# Patient Record
Sex: MALE | Race: WHITE | NOT HISPANIC OR LATINO | Employment: FULL TIME | URBAN - METROPOLITAN AREA
[De-identification: names, ages, dates, MRNs, and addresses within clinical notes are randomized per-mention and may not be internally consistent; named-entity substitution may affect disease eponyms.]

---

## 2018-09-08 ENCOUNTER — HOSPITAL ENCOUNTER (EMERGENCY)
Facility: HOSPITAL | Age: 23
Discharge: HOME | End: 2018-09-08
Attending: EMERGENCY MEDICINE
Payer: COMMERCIAL

## 2018-09-08 VITALS
TEMPERATURE: 98 F | HEIGHT: 68 IN | SYSTOLIC BLOOD PRESSURE: 95 MMHG | RESPIRATION RATE: 16 BRPM | OXYGEN SATURATION: 97 % | BODY MASS INDEX: 24.25 KG/M2 | DIASTOLIC BLOOD PRESSURE: 53 MMHG | WEIGHT: 160 LBS | HEART RATE: 62 BPM

## 2018-09-08 DIAGNOSIS — N99.89 POSTOPERATIVE SURGICAL COMPLICATION INVOLVING GENITOURINARY SYSTEM ASSOCIATED WITH GENITOURINARY PROCEDURE, UNSPECIFIED COMPLICATION: Primary | ICD-10-CM

## 2018-09-08 PROBLEM — Z78.9 FEMALE-TO-MALE TRANSGENDER PERSON: Status: ACTIVE | Noted: 2018-09-08

## 2018-09-08 PROBLEM — Z87.890: Status: ACTIVE | Noted: 2018-09-08

## 2018-09-08 LAB
ALBUMIN SERPL BCP-MCNC: 3.5 G/DL
ALP SERPL-CCNC: 81 U/L
ALT SERPL W/O P-5'-P-CCNC: 20 U/L
ANION GAP SERPL CALC-SCNC: 9 MMOL/L
AST SERPL-CCNC: 17 U/L
BACTERIA #/AREA URNS AUTO: ABNORMAL /HPF
BASOPHILS # BLD AUTO: 0.04 K/UL
BASOPHILS NFR BLD: 0.9 %
BILIRUB SERPL-MCNC: 0.2 MG/DL
BILIRUB UR QL STRIP: NEGATIVE
BUN SERPL-MCNC: 10 MG/DL
CALCIUM SERPL-MCNC: 9.6 MG/DL
CHLORIDE SERPL-SCNC: 107 MMOL/L
CLARITY UR REFRACT.AUTO: CLEAR
CO2 SERPL-SCNC: 26 MMOL/L
COLOR UR AUTO: ABNORMAL
CREAT SERPL-MCNC: 1 MG/DL
CRP SERPL-MCNC: 23.5 MG/L
DIFFERENTIAL METHOD: ABNORMAL
EOSINOPHIL # BLD AUTO: 0.2 K/UL
EOSINOPHIL NFR BLD: 4.6 %
ERYTHROCYTE [DISTWIDTH] IN BLOOD BY AUTOMATED COUNT: 13 %
EST. GFR  (AFRICAN AMERICAN): >60 ML/MIN/1.73 M^2
EST. GFR  (NON AFRICAN AMERICAN): >60 ML/MIN/1.73 M^2
GLUCOSE SERPL-MCNC: 76 MG/DL
GLUCOSE UR QL STRIP: NEGATIVE
HCT VFR BLD AUTO: 42.5 %
HGB BLD-MCNC: 13.5 G/DL
HGB UR QL STRIP: ABNORMAL
IMM GRANULOCYTES # BLD AUTO: 0.01 K/UL
IMM GRANULOCYTES NFR BLD AUTO: 0.2 %
KETONES UR QL STRIP: NEGATIVE
LACTATE SERPL-SCNC: 1.2 MMOL/L
LEUKOCYTE ESTERASE UR QL STRIP: ABNORMAL
LYMPHOCYTES # BLD AUTO: 1.8 K/UL
LYMPHOCYTES NFR BLD: 40.2 %
MCH RBC QN AUTO: 28.1 PG
MCHC RBC AUTO-ENTMCNC: 31.8 G/DL
MCV RBC AUTO: 89 FL
MICROSCOPIC COMMENT: ABNORMAL
MONOCYTES # BLD AUTO: 0.4 K/UL
MONOCYTES NFR BLD: 7.6 %
NEUTROPHILS # BLD AUTO: 2.1 K/UL
NEUTROPHILS NFR BLD: 46.5 %
NITRITE UR QL STRIP: NEGATIVE
NRBC BLD-RTO: 0 /100 WBC
PH UR STRIP: 6 [PH] (ref 5–8)
PLATELET # BLD AUTO: 201 K/UL
PMV BLD AUTO: 11.2 FL
POTASSIUM SERPL-SCNC: 3.9 MMOL/L
PROT SERPL-MCNC: 6.5 G/DL
PROT UR QL STRIP: NEGATIVE
RBC # BLD AUTO: 4.8 M/UL
RBC #/AREA URNS AUTO: 9 /HPF (ref 0–4)
SODIUM SERPL-SCNC: 142 MMOL/L
SP GR UR STRIP: 1.01 (ref 1–1.03)
URN SPEC COLLECT METH UR: ABNORMAL
UROBILINOGEN UR STRIP-ACNC: NEGATIVE EU/DL
WBC # BLD AUTO: 4.58 K/UL
WBC #/AREA URNS AUTO: 10 /HPF (ref 0–5)

## 2018-09-08 PROCEDURE — 25000003 PHARM REV CODE 250: Performed by: STUDENT IN AN ORGANIZED HEALTH CARE EDUCATION/TRAINING PROGRAM

## 2018-09-08 PROCEDURE — 63600175 PHARM REV CODE 636 W HCPCS: Performed by: STUDENT IN AN ORGANIZED HEALTH CARE EDUCATION/TRAINING PROGRAM

## 2018-09-08 PROCEDURE — 96365 THER/PROPH/DIAG IV INF INIT: CPT

## 2018-09-08 PROCEDURE — 96366 THER/PROPH/DIAG IV INF ADDON: CPT

## 2018-09-08 PROCEDURE — 99284 EMERGENCY DEPT VISIT MOD MDM: CPT | Mod: ,,, | Performed by: NURSE PRACTITIONER

## 2018-09-08 PROCEDURE — 81001 URINALYSIS AUTO W/SCOPE: CPT

## 2018-09-08 PROCEDURE — 96367 TX/PROPH/DG ADDL SEQ IV INF: CPT

## 2018-09-08 PROCEDURE — 99285 EMERGENCY DEPT VISIT HI MDM: CPT | Mod: 25

## 2018-09-08 PROCEDURE — 83605 ASSAY OF LACTIC ACID: CPT

## 2018-09-08 PROCEDURE — 87040 BLOOD CULTURE FOR BACTERIA: CPT

## 2018-09-08 PROCEDURE — 85025 COMPLETE CBC W/AUTO DIFF WBC: CPT

## 2018-09-08 PROCEDURE — 86140 C-REACTIVE PROTEIN: CPT

## 2018-09-08 PROCEDURE — 80053 COMPREHEN METABOLIC PANEL: CPT

## 2018-09-08 PROCEDURE — 96375 TX/PRO/DX INJ NEW DRUG ADDON: CPT

## 2018-09-08 RX ORDER — MORPHINE SULFATE 2 MG/ML
3 INJECTION, SOLUTION INTRAMUSCULAR; INTRAVENOUS ONCE
Status: COMPLETED | OUTPATIENT
Start: 2018-09-08 | End: 2018-09-08

## 2018-09-08 RX ORDER — ONDANSETRON 8 MG/1
8 TABLET, ORALLY DISINTEGRATING ORAL
Status: COMPLETED | OUTPATIENT
Start: 2018-09-08 | End: 2018-09-08

## 2018-09-08 RX ORDER — VANCOMYCIN HCL IN 5 % DEXTROSE 1.5G/250ML
20 PLASTIC BAG, INJECTION (ML) INTRAVENOUS
Status: COMPLETED | OUTPATIENT
Start: 2018-09-08 | End: 2018-09-08

## 2018-09-08 RX ORDER — ACETAMINOPHEN 500 MG
1000 TABLET ORAL
Status: COMPLETED | OUTPATIENT
Start: 2018-09-08 | End: 2018-09-08

## 2018-09-08 RX ADMIN — Medication 3 MG: at 01:09

## 2018-09-08 RX ADMIN — ACETAMINOPHEN 1000 MG: 500 TABLET ORAL at 01:09

## 2018-09-08 RX ADMIN — PIPERACILLIN AND TAZOBACTAM 4.5 G: 4; .5 INJECTION, POWDER, LYOPHILIZED, FOR SOLUTION INTRAVENOUS; PARENTERAL at 02:09

## 2018-09-08 RX ADMIN — VANCOMYCIN HYDROCHLORIDE 1500 MG: 10 INJECTION, POWDER, LYOPHILIZED, FOR SOLUTION INTRAVENOUS at 03:09

## 2018-09-08 RX ADMIN — ONDANSETRON 8 MG: 8 TABLET, ORALLY DISINTEGRATING ORAL at 01:09

## 2018-09-08 NOTE — ED PROVIDER NOTES
Encounter Date: 9/8/2018       History     Chief Complaint   Patient presents with    Wound Infection     Pt presented to the ED via EMS. Pt had gender reassignment surgery in [place] and contracted an infection.      23 FtoM presents 4wks s/p gender reassignment surgery c/o 2 day hx sharp, throbbing pelvic pain localized to deep pelvis and vaginectomy site. He underwent phalloplasty, scrotoplasty, and vaginectomy August 6th in [place]. He had previously undergone radical hysterectomy in May without complication.    Following his Aug 6th surgery he developed a post-op infection after 1-2 wks that was treated with bactrim x7 days and seemed to resolve the infection. 3 days ago he started developing fevers, orthostatic presyncope, lethargy, hematuria, dysuria, bladder spasms, and severe generalized deep pelvic pain more intense at the vaginectomy site. He presented to [facility] in [facility] where a CT abdo/pelvis demonstrated a fluid collection within the surgical phallus concerning for abscess vs hematoma vs seroma and was xfer to [facility] for higher level care. Denies SoB, headache, cp, syncope          Review of patient's allergies indicates:  No Known Allergies  No past medical history on file.  No past surgical history on file.  No family history on file.  Social History     Tobacco Use    Smoking status: Not on file   Substance Use Topics    Alcohol use: Not on file    Drug use: Not on file     Review of Systems   Constitutional: Positive for fatigue and fever.   HENT: Negative for congestion and sore throat.    Eyes: Negative for photophobia and visual disturbance.   Respiratory: Negative for cough, shortness of breath and wheezing.    Cardiovascular: Negative for chest pain, palpitations and leg swelling.   Gastrointestinal: Positive for abdominal pain (Lower abdo / pelvis) and nausea. Negative for abdominal distention, constipation, diarrhea and vomiting.   Genitourinary: Positive for dysuria,  genital sores, hematuria and penile pain. Negative for flank pain and frequency.        Deep, sharp, waxing/waning pelvic pain   Musculoskeletal: Negative for back pain, myalgias, neck pain and neck stiffness.   Skin: Positive for rash (Surgical site erythema and swelling) and wound. Negative for color change and pallor.   Neurological: Positive for dizziness, weakness and light-headedness. Negative for syncope and headaches.   Hematological: Does not bruise/bleed easily.   Psychiatric/Behavioral: Negative for confusion and decreased concentration.       Physical Exam     Initial Vitals [09/08/18 1217]   BP Pulse Resp Temp SpO2   103/63 (!) 56 18 98.2 °F (36.8 °C) 98 %      MAP       --         Physical Exam    Nursing note and vitals reviewed.  Constitutional: He appears well-developed and well-nourished. He is not diaphoretic. No distress.   HENT:   Head: Normocephalic and atraumatic.   Eyes: Conjunctivae are normal. No scleral icterus.   Neck: No JVD present.   Cardiovascular: Normal rate, regular rhythm, normal heart sounds and intact distal pulses. Exam reveals no gallop and no friction rub.    Pulmonary/Chest: Breath sounds normal. No stridor. No respiratory distress. He has no wheezes. He has no rhonchi. He has no rales.   Abdominal: Soft. Bowel sounds are normal. He exhibits no distension. There is tenderness (suprapubic tenderness diffusely to deep palpation. Suprapubic catheter in situ). There is no rebound and no guarding.   Genitourinary:   Genitourinary Comments: Surgically created phallus and scrotum with superficial ulceration and erythema to ventral penis without visible exudate. There is a perineal surgical wound posteroinferior to the scrotum that is highly erythematous and tender, pt states is vaginectomy site.    Musculoskeletal: He exhibits no edema or tenderness.   Neurological: He is alert and oriented to person, place, and time. GCS score is 15. GCS eye subscore is 4. GCS verbal subscore is  5. GCS motor subscore is 6.   Skin: Skin is warm.   Psychiatric: He has a normal mood and affect.         ED Course   Procedures  Labs Reviewed   CBC W/ AUTO DIFFERENTIAL - Abnormal; Notable for the following components:       Result Value    Hemoglobin 13.5 (*)     MCHC 31.8 (*)     All other components within normal limits   C-REACTIVE PROTEIN - Abnormal; Notable for the following components:    CRP 23.5 (*)     All other components within normal limits   URINALYSIS, REFLEX TO URINE CULTURE - Abnormal; Notable for the following components:    Occult Blood UA 2+ (*)     Leukocytes, UA Trace (*)     All other components within normal limits    Narrative:     Preferred Collection Type->Urine, Clean Catch   URINALYSIS MICROSCOPIC - Abnormal; Notable for the following components:    RBC, UA 9 (*)     WBC, UA 10 (*)     All other components within normal limits    Narrative:     Preferred Collection Type->Urine, Clean Catch   CULTURE, BLOOD   CULTURE, BLOOD   COMPREHENSIVE METABOLIC PANEL   LACTIC ACID, PLASMA     EKG Readings: (Independently Interpreted)   Initial Reading: No STEMI. Rhythm: Sinus Bradycardia. Heart Rate: 58. Ectopy: No Ectopy. Conduction: Normal. ST Segments: Normal ST Segments. T Waves: Normal. Axis: Normal. Clinical Impression: Sinus Bradycardia       Imaging Results    None          Medical Decision Making:   Initial Assessment:   23M presenting 4wks s/p FtoM gender reassignment surgery with 2 day hx sharp, throbbing pelvic pain worst at vaginectomy site, as well as UTI symptoms, fever, lethargy      Differential Diagnosis:   Differentials include but are not limited to: Cystitis, pelvic abscess, vaginovesical fistula, surgical site infection or fistula.   Clinical Tests:   Lab Tests: Ordered  ED Management:  Analgesia  Basic Labs, blood cultures  ABs  General Surgery Consult  Plastic Consult       APC / Resident Notes:   Assumed care patient.  Urology at bedside to see patient.  As per Urology  patient can be discharged home.  Patient already on Bactrim.  Drain placed to Desai bag for drainage. Patient to follow up with Urology and plastic surgery within the next 2 weeks.  Patient also states he has a follow-up in [place] with a specialist.  Patient to contact Urology for further plan.  Patient reassessed and aware of plan of care.  All questions and concerns addressed. Patient is hemodynamically stable, vital signs are normal. Discharge instructions given. Return to ED precautions discussed. Follow up as directed. Pt verbalized understanding of this plan. Pt is stable for discharge.            Attending Attestation:   Physician Attestation Statement for Resident:  As the supervising MD   Physician Attestation Statement: I have personally seen and examined this patient.    As the supervising MD I agree with the above PE.    As the supervising MD I agree with the above treatment, course, plan, and disposition.   -: Ct scan did not show any focal abscess and no high wbc. Is being evaluated by uro, gen surg and plastics.                        Clinical Impression:   The encounter diagnosis was Postoperative surgical complication involving genitourinary system associated with genitourinary procedure, unspecified complication.      Disposition:   Disposition: Discharged  Condition: Stable                        Luisana Rosales NP  09/08/18 3166

## 2018-09-08 NOTE — ED NOTES
"Pt. Laying in bed, a&ox4, gcs 15. Pt. States feeling fatigued, lethargic, generalized weakness x2 days. Pt. States fever pta, afebrile at this time. Had gender reassignment surgery 1 month ago, has since then noticed minimal wounds on posterior side of penis with minimal red-tinged drainage. Also c/o severe pain "throbbing" near fistula near scrotum. Given pain meds pta with relief. Pt. Has suprapubic cath in place pta, currently being treated for uti with po bactrim and iv rocephin from outlying facility. Pt. Also has bilateral skin grafts noted to thighs, minimal drainage noted, healing appropriately, no swelling, using aquacel at home. Pt. Denies sob, cp, abd pain. States intermittent nausea, given phenergan iv pta with relief. No other acute distress noted at this time, on cardiac and o2 monitor, call light within reach, will continue to monitor frequently.   "

## 2018-09-08 NOTE — ED PROVIDER NOTES
Encounter Date: 9/8/2018    SCRIBE #1 NOTE: Kelin WILLAMS am scribing for, and in the presence of, Dr. Rob. the Resident attestation.       History     Chief Complaint   Patient presents with    Wound Infection     Pt presented to the ED via EMS. Pt had gender reassignment surgery in [place] and contracted an infection.      HPI  Review of patient's allergies indicates:  No Known Allergies  No past medical history on file.  No past surgical history on file.  No family history on file.  Social History     Tobacco Use    Smoking status: Not on file   Substance Use Topics    Alcohol use: Not on file    Drug use: Not on file     Review of Systems    Physical Exam     Initial Vitals [09/08/18 1217]   BP Pulse Resp Temp SpO2   103/63 (!) 56 18 98.2 °F (36.8 °C) 98 %      MAP       --         Physical Exam    ED Course   Procedures  Labs Reviewed   CBC W/ AUTO DIFFERENTIAL - Abnormal; Notable for the following components:       Result Value    Hemoglobin 13.5 (*)     MCHC 31.8 (*)     All other components within normal limits   C-REACTIVE PROTEIN - Abnormal; Notable for the following components:    CRP 23.5 (*)     All other components within normal limits   URINALYSIS, REFLEX TO URINE CULTURE - Abnormal; Notable for the following components:    Occult Blood UA 2+ (*)     Leukocytes, UA Trace (*)     All other components within normal limits    Narrative:     Preferred Collection Type->Urine, Clean Catch   URINALYSIS MICROSCOPIC - Abnormal; Notable for the following components:    RBC, UA 9 (*)     WBC, UA 10 (*)     All other components within normal limits    Narrative:     Preferred Collection Type->Urine, Clean Catch   CULTURE, BLOOD   CULTURE, BLOOD   COMPREHENSIVE METABOLIC PANEL   LACTIC ACID, PLASMA          Imaging Results    None                       Attending Attestation:   Physician Attestation Statement for Resident:  As the supervising MD   Physician Attestation Statement: I have  personally seen and examined this patient.   I agree with the above history. -:                      Clinical Impression:   No diagnosis found.

## 2018-09-08 NOTE — ED NOTES
Patient resting comfortably. States no pain at this time. Cardiac and O2 monitor in place. No distress at this time will continue to monitor. Call light within reach side rails up and bed in lowest position.

## 2018-09-08 NOTE — ED NOTES
ED physician made aware of pain returning. Pt. Requesting valium for spasms. No other acute distress noted at this time. On cardiac and o2 monitor, call light within reach, will continue to monitor. Awaiting urology consult.

## 2018-09-08 NOTE — CONSULTS
[facility]  History & Physical  Plastic Surgery    SUBJECTIVE:     Chief Complaint: perineal pain    History of Present Illness:  Patient is a 23 y.o. male w/ a hx of male-female surgery with creation of neophallus and scrotoplasty with urethral lengthening, with suprapubic catheter on august 6. 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 He was in [place] until 4 days ago, and went back to his home in [place] [place], with plans to follow up with a urologist in [facility]. He does not have any established relationship with this specific urologist and does not have scheduled follow up at this time, but is planning on setting this up soon.   He has been in close contact with his surgeon, who does not know that he is here in the ED today.     He started bactrim 2 days ago due for a UTI.   He first presented at an OSH for significant perineal pain at the inferior aspect of the scrotoplasty. CT scan at the OSH was performed, which did not reveal any major findings, just a 1.3x4cm fluid collection at the distal end of the phallus.     review of patient's allergies indicates:  No Known Allergies    No past medical history on file.  No past surgical history on file.  No family history on file.  Social History     Tobacco Use    Smoking status: Not on file   Substance Use Topics    Alcohol use: Not on file    Drug use: Not on file        Review of Systems:  Constitutional: malaise  Respiratory: no cough or shortness of breath  Cardiovascular: no chest pain or palpitations  Gastrointestinal: no nausea or vomiting, tolerating diet  Integument/Breast: no rash or pruritis  Musculoskeletal: no arthralgias or myalgias  Neurological: no seizures or tremors  Behavioral/Psych: no auditory or visual hallucinations    OBJECTIVE:     Vital Signs (Most Recent):  Temp: 98.2 °F (36.8 °C) (09/08/18  1217)  Pulse: (!) 57 (09/08/18 1600)  Resp: 17 (09/08/18 1600)  BP: (!) 94/53 (09/08/18 1600)  SpO2: 99 % (09/08/18 1600)    Physical Exam:  General: no distress  Chest Wall: no tenderness  Abdomen: normal findings: nontender  Genitalia: viable neophallus, with known fistulous openings on ventral aspect. no erythema. open wound between neophallus and scrotoplasty, as well as scrotoplasty and perineum. no purulence.   Extremities: no cyanosis or edema, or clubbing  Skin: Skin color, texture, turgor normal. No rashes or lesions  Neurologic: Alert and oriented. Thought content appropriate   Thigh donor sites clean, with skin graft from L thigh to R thigh    Laboratory:  CBC:   Recent Labs   Lab  09/08/18   1301   WBC  4.58   RBC  4.80   HGB  13.5*   HCT  42.5   PLT  201   MCV  89   MCH  28.1   MCHC  31.8*     Recent Labs   Lab  09/08/18   1301   COLORU  Straw   SPECGRAV  1.015   PHUR  6.0   PROTEINUA  Negative   BACTERIA  Rare   NITRITE  Negative   LEUKOCYTESUR  Trace*   UROBILINOGEN  Negative       Diagnostic Results:  CT: Reviewed     Results: fluid collection at distal phallus measuring 4x1.3cm.     ASSESSMENT/PLAN:   23M s/p female to male conversion surgery, s/p neophallus, scrotoplasty, urethral lengthening on august 6, here with the specific complaint of perineal pain. No perineal findings on CT scan. No intervention indicated in regards to the fluid collection on the distal phallus.   -I made contact with  of dr villatoro, who will be following up with the patient via phone. I also left my contact information with her for him to contact me with questions  -he needs to follow up with urology as soon as possible. He has chosen a urologist in [facility], but still needs to make contact  -consider urology consult today  -although he does not live here in town, I offered for him to follow up with us should he have any concerns, with Dr Santoyo. Information will be added to the follow up  info  -continue bactrim- he already has these prescribed, and this can be directed by dr villatoro or urology    Milton Méndez MD  Plastic Surgery PGY6

## 2018-09-08 NOTE — ED TRIAGE NOTES
Pt. Presents to ED today as transfer from [facility] with ems for wound infection post gender reassignment surgery. Pt. States severe pain in perineum x1 day, given morphine and phenergan pta. Pt. Has suprapubic cath in place pta with current uti that was treated with antbx. Skin grafts noted to bilateral thighs.

## 2018-09-09 NOTE — SUBJECTIVE & OBJECTIVE
No past medical history on file.    No past surgical history on file.    Review of patient's allergies indicates:  No Known Allergies    Family History     None          Tobacco Use    Smoking status: Not on file   Substance and Sexual Activity    Alcohol use: Not on file    Drug use: Not on file    Sexual activity: Not on file       Review of Systems   Constitutional: Negative for activity change, appetite change, chills, fever and unexpected weight change.   HENT: Negative.    Eyes: Negative.    Respiratory: Negative for chest tightness and shortness of breath.    Cardiovascular: Negative for chest pain.   Gastrointestinal: Negative for abdominal distention, abdominal pain, nausea and vomiting.   Genitourinary:        Pelvic pain  Urinary leakage from fistula   Musculoskeletal: Negative.    Skin: Negative.    Neurological: Negative.    Psychiatric/Behavioral: Negative.        Objective:     Temp:  [98.2 °F (36.8 °C)-98.7 °F (37.1 °C)] 98.2 °F (36.8 °C)  Pulse:  [53-97] 63  Resp:  [14-20] 18  SpO2:  [96 %-100 %] 98 %  BP: ()/(52-70) 102/64     Body mass index is 24.33 kg/m².            Drains          None          Physical Exam   Constitutional: He appears well-developed and well-nourished. No distress.   HENT:   Head: Normocephalic and atraumatic.   Eyes: EOM are normal. No scleral icterus.   Cardiovascular: Normal rate and regular rhythm.    Pulmonary/Chest: Effort normal. No respiratory distress.   Abdominal: Soft. He exhibits no distension. There is no tenderness.   SPT site with 16fr catheter with clear yellow urine   Genitourinary:   Genitourinary Comments: Neophallus with small areas that appear to have dehisced at ventral suture line. Neomeatus patent. No areas of fluctuance of neophallus. Marc-penoscrotal junction with obvious wound dehiscence, area is damp and extremely tender. Neoscrotum tender, no areas of fluctuance. Vaginectomy site mostly healed, one small area of erythematous and inflamed  tissue at most dependent portion near posterior aspect of neoscrotum at the midline.    Musculoskeletal: He exhibits no edema.   Neurological: He is alert.   Skin: Skin is warm and dry.     Psychiatric: He has a normal mood and affect. His behavior is normal.       Significant Labs:    BMP:  Recent Labs   Lab  09/08/18   1301   NA  142   K  3.9   CL  107   CO2  26   BUN  10   CREATININE  1.0   CALCIUM  9.6       CBC:  Recent Labs   Lab  09/08/18   1301   WBC  4.58   HGB  13.5*   HCT  42.5   PLT  201       All pertinent labs results from the past 24 hours have been reviewed.    Significant Imaging:  CT: I have reviewed all results within the past 24 hours and my personal findings are:  CT from OSH not personally reviewed and report not available. Per plastic surgery note: 1.3 x 4 cm fluid collection at distal portion of neophallus, likely hematoma

## 2018-09-09 NOTE — ED NOTES
Pt. Denies questions or concerns regarding discharge instructions or fu. No acute distress noted. Ambulatory with steady gait to lobby.

## 2018-09-09 NOTE — ASSESSMENT & PLAN NOTE
- This is an unusual and unfamiliar post op issue. 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 He keeps his SPT clamped which directs urine through his neourethra and fistulous tract. Have discussed with the patient that he will need to divert urine away from his fistula in order for the area to mature. Once mature, the area can can be properly evaluated for definitive repair. Since he already has a suprapubic tube I have recommended that the patient unclamp the tube and leave it draining to gravity. He has urinary sphincter function and is not incontinent. This should prevent urinary leakage through the fistula.   - Will have him see Dr. Holland and possibly plastic surgery in 1-2 weeks here in [place]. At that point his issue can be further evaluated and a plan regarding reconstruction options can be had. However, he also has a physician in [place] that he would also like to meet with who specializes in fistulas and reconstruction.

## 2018-09-09 NOTE — HPI
22yo M with history of interstitial cystitis who underwent gender reassignment surgery (female to male) on August 6th by Dr. Delgadillo in [place]. 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 He comes to the ED today with complaints of perineal pain and poorly healing suture lines and skin irritation. He is from just outside of [place], [place] and was planning on finding a urologist to help with his fistula when he came left [place] on September 4th. He went to an outside hospital in [place] for evaluation and was transferred to [facility]. He has a suprapubic catheter that was placed at the time of his surgery. He keeps this clamped until he needs to void. When he voids he has urinary leakage through the fistula, very little if any urine makes it to the neomeatus. He unclamps his SPT for additional drainage.     On arrival to the ED he is afebrile with stable vital signs. He does not have a leukocytosis. His lab values are all normal. UA from SPT is clear (he is on Bactrim).

## 2018-09-10 ENCOUNTER — TELEPHONE (OUTPATIENT)
Dept: UROLOGY | Facility: CLINIC | Age: 23
End: 2018-09-10

## 2018-09-13 LAB
BACTERIA BLD CULT: NORMAL
BACTERIA BLD CULT: NORMAL

## 2018-09-24 ENCOUNTER — OFFICE VISIT (OUTPATIENT)
Dept: PLASTIC SURGERY | Facility: CLINIC | Age: 23
End: 2018-09-24
Payer: COMMERCIAL

## 2018-09-24 ENCOUNTER — OFFICE VISIT (OUTPATIENT)
Dept: UROLOGY | Facility: CLINIC | Age: 23
End: 2018-09-24
Payer: COMMERCIAL

## 2018-09-24 VITALS
BODY MASS INDEX: 25.01 KG/M2 | DIASTOLIC BLOOD PRESSURE: 71 MMHG | SYSTOLIC BLOOD PRESSURE: 111 MMHG | HEIGHT: 68 IN | RESPIRATION RATE: 15 BRPM | HEART RATE: 61 BPM | WEIGHT: 165 LBS

## 2018-09-24 DIAGNOSIS — Z87.890 TRANSGENDER, S/P SEX REASSIGNMENT SURGERY: ICD-10-CM

## 2018-09-24 DIAGNOSIS — Z78.9 FEMALE-TO-MALE TRANSGENDER PERSON: Primary | ICD-10-CM

## 2018-09-24 DIAGNOSIS — N36.0 URETHRAL FISTULA: Primary | ICD-10-CM

## 2018-09-24 PROCEDURE — 99999 PR PBB SHADOW E&M-EST. PATIENT-LVL IV: CPT | Mod: PBBFAC,,, | Performed by: UROLOGY

## 2018-09-24 PROCEDURE — 99202 OFFICE O/P NEW SF 15 MIN: CPT | Mod: S$GLB,,, | Performed by: SURGERY

## 2018-09-24 PROCEDURE — 99205 OFFICE O/P NEW HI 60 MIN: CPT | Mod: S$GLB,,, | Performed by: UROLOGY

## 2018-09-24 PROCEDURE — 3008F BODY MASS INDEX DOCD: CPT | Mod: CPTII,S$GLB,, | Performed by: UROLOGY

## 2018-09-24 RX ORDER — DEXTROAMPHETAMINE SACCHARATE, AMPHETAMINE ASPARTATE, DEXTROAMPHETAMINE SULFATE AND AMPHETAMINE SULFATE 5; 5; 5; 5 MG/1; MG/1; MG/1; MG/1
TABLET ORAL
COMMUNITY
Start: 2018-09-18 | End: 2018-09-24

## 2018-09-24 RX ORDER — DEXTROAMPHETAMINE SACCHARATE, AMPHETAMINE ASPARTATE, DEXTROAMPHETAMINE SULFATE AND AMPHETAMINE SULFATE 2.5; 2.5; 2.5; 2.5 MG/1; MG/1; MG/1; MG/1
TABLET ORAL
COMMUNITY
End: 2018-09-24

## 2018-09-24 RX ORDER — OXYBUTYNIN CHLORIDE 5 MG/1
TABLET ORAL 3 TIMES DAILY
COMMUNITY
Start: 2018-09-21

## 2018-09-24 RX ORDER — TESTOSTERONE CYPIONATE 1000 MG/10ML
150 INJECTION, SOLUTION INTRAMUSCULAR
COMMUNITY

## 2018-09-24 RX ORDER — DEXTROAMPHETAMINE SACCHARATE, AMPHETAMINE ASPARTATE, DEXTROAMPHETAMINE SULFATE AND AMPHETAMINE SULFATE 5; 5; 5; 5 MG/1; MG/1; MG/1; MG/1
20 TABLET ORAL
COMMUNITY

## 2018-09-24 NOTE — LETTER
September 25, 2018      Denton Walton MD  [address]  [facility] [address]           [facility]  [address]  [facility] [address]  Phone: [phone]          Patient: Xu Bonilla   MR Number: [id number]   YOB: 1995   Date of Visit: 9/24/2018       Dear Dr. Denton Walton:    Thank you for referring Xu Bonilla to me for evaluation. Attached you will find relevant portions of my assessment and plan of care.    If you have questions, please do not hesitate to call me. I look forward to following Xu Bonilla along with you.    Sincerely,    Alfredito Holland MD    Enclosure  CC:  No Recipients    If you would like to receive this communication electronically, please contact [email or web address] or [phone] to request more information on [employer] Link access.    For providers and/or their staff who would like to refer a patient to [facility], please contact us through our one-stop-shop provider referral line, [facility] , at [phone].    If you feel you have received this communication in error or would no longer like to receive these types of communications, please e-mail [email or web address]

## 2018-09-24 NOTE — PROGRESS NOTES
PLASTIC SURGERY CONSULTATION NOTE  Referring: Dr. Alfredito Holland    CC  No chief complaint on file.  Phalloplasty, with urinary fistula    PCP: Primary Doctor No    HPI  History from patient, chart, referring provider, and patient    Xu Bonilla is a 23 y.o. male presenting with urinary fistula after thigh based phalloplasty 2 months ago.  His surgeons were in [place].  He stayed there for four weeks.  He currently lives in [place] and is returning to work as an EMT.  Earlier this month he presented to the ED with pelvic pain and was scanned.  He was instructed by his plastic surgeon to follow up with a urologist for management of his known fistula, suprapubic catheter.  The patient reports that he had a fonseca crossing his urethral anastomosis for 1 month.  It was taken out in [place].  He has performed voiding trials.  He was planning on returning to either [place] or [place] for his second stage debulking procedure.   He states that he had a vein graft taken from his right lower extremity.  He states that his tissue transfer was done as a microsurgical procedure.  He is open to the idea of having a fonseca placed in his neophallus if this is technically possible to help keep it patent while he is awaiting care with another surgeon.      PM  Patient Active Problem List    Diagnosis Date Noted    Transgender, S/P sex reassignment surgery 09/08/2018   As noted above, otherwise healthy    [facility]  Past Surgical History:   Procedure Laterality Date    bilateral subcutaneous mastectomy  2015    HYSTERECTOMY      phalloplasty  08/06/2018    TONSILLECTOMY  2017   As noted above, otherwise healthy    FH  Family History   Problem Relation Age of Onset    No Known Problems Father     Personality disorder Mother     Bipolar disorder Mother    Pertinent HPI negative    MEDICATIONS  No outpatient medications have been marked as taking for the 9/24/18 encounter (Appointment) with Doc Santoyo MD.  "    ALLERGIES   Review of patient's allergies indicates:   Allergen Reactions    Lortab [hydrocodone-acetaminophen] Other (See Comments)     Cannot sleep - gets "wired"     SOCIAL HISTORY  Tobacco:   Social History     Tobacco Use   Smoking Status Former Smoker    Start date: 2010    Last attempt to quit: 2016    Years since quittin.0     EtOH:   Social History     Substance and Sexual Activity   Alcohol Use Yes    Alcohol/week: 0.6 oz    Types: 1 Cans of beer per week   Works as an EMT, returning to work tomorrow.      ROS  Review of Systems:  Constitutional: malaise  Respiratory: no cough or shortness of breath  Cardiovascular: no chest pain or palpitations  Gastrointestinal: no nausea or vomiting, tolerating diet  Integument/Breast: no rash or pruritis  Musculoskeletal: no arthralgias or myalgias  Neurological: no seizures or tremors  Behavioral/Psych: no auditory or visual hallucinations    PHYSICAL EXAM  There were no vitals taken for this visit.  Patient points to granulating punctum over ventral neophallus that is the site of urinary drainage  He points out a second punctum within the perineal border of his neoscrotum that he says is not leaking urine  Right thigh skin graft healing  Right groin incision in tact  Bulky neophallus that appears viable  Able to express fluid from distal neourethra, not purulent  No evidence of warmth, purulent drainage, or cellulitis in right thigh or neophallus    General: no distress  Chest Wall: no tenderness  Abdomen: normal findings: nontender  Extremities: no cyanosis or edema, or clubbing  Skin: Skin color, texture, turgor normal. No rashes or lesions  Neurologic: Alert and oriented. Thought content appropriate     LABS  Lab Results   Component Value Date    WBC 4.58 2018    HGB 13.5 (L) 2018    HCT 42.5 2018    MCV 89 2018     2018     Lab Results   Component Value Date     2018    K 3.9 2018    "  09/08/2018    CO2 26 09/08/2018     Lab Results   Component Value Date    ALBUMIN 3.5 09/08/2018     Lab Results   Component Value Date    CREATININE 1.0 09/08/2018     No results found for: LABA1C, HGBA1C    Previously reviewed CT scan: fluid collection within tubed neophallus distal to where he anatomically would have had urethral anastomosis.  No evidence of pelvic fluid collection.      ASSESSMENT  No diagnosis found.  24 yo M who completed female to male gender confirmation surgery with microsurgically reconstructed with apparent urinary fistula through neophallus flap that is very close to his urethral anastomosis.  No evidence of infection today.    PLAN  After extensive discussions regarding all of these issues, it is my recommendation that Dr. Holland consider scope examination to determine whether a fonseca can be placed safely across the anastomosis between his jovani-urethra and native urethra.   The patient had ample opportunity to ask questions and verbalized understanding of the surgical treatment plan.    I have spent 20 minutes of face to face time with the patient, of which > 50% was spent on counseling and coordination of care for gender confirmation.       Doc Santoyo

## 2018-09-24 NOTE — LETTER
September 24, 2018      Alfredito Holland MD  [address]  [facility] [address]           [facility]  [address]  [facility] [address]  Phone: [phone]  Fax: [phone]          Patient: Xu Bonilla   MR Number: [id number]   YOB: 1995   Date of Visit: 9/24/2018       Dear Dr. Alfredito Holland:    Thank you for referring Xu Bonilla to me for evaluation. Attached you will find relevant portions of my assessment and plan of care.    If you have questions, please do not hesitate to call me. I look forward to following Xu Bonilla along with you.    Sincerely,    Doc Santoyo MD    Enclosure  CC:  No Recipients    If you would like to receive this communication electronically, please contact [email or web address] or [phone] to request more information on [employer] access.    For providers and/or their staff who would like to refer a patient to [facility], please contact us through our one-stop-shop provider referral line, [facility] [place], at [phone].    If you feel you have received this communication in error or would no longer like to receive these types of communications, please e-mail [email or web address]

## 2018-09-25 NOTE — PATIENT INSTRUCTIONS
Cystoscopy    Cystoscopy is a procedure that lets your doctor look directly inside your urethra and bladder. It can be used to:  · Help diagnose a problem with your urethra, bladder, or kidneys.  · Take a sample (biopsy) of bladder or urethral tissue.  · Treat certain problems (such as removing kidney stones).  · Place a stent to bypass an obstruction.  · Take special X-rays of the kidneys.  Based on the findings, your doctor may recommend other tests or treatments.  What is a cystoscope?  A cystoscope is a telescope-like instrument that contains lenses and fiberoptics (small glass wires that make bright light). The cystoscope may be straight and rigid, or flexible to bend around curves in the urethra. The doctor may look directly into the cystoscope, or project the image onto a monitor.  Getting ready  · Ask your doctor if you should stop taking any medicines before the procedure.  · Ask whether you should avoid eating or drinking anything after midnight before the procedure.  · Follow any other instructions your doctor gives you.  Tell your doctor before the exam if you:  · Take any medicines, such as aspirin or blood thinners  · Have allergies to any medicines  · Are pregnant   The procedure  Cystoscopy is done in the doctors office, surgery center, or hospital. The doctor and a nurse are present during the procedure. It takes only a few minutes, longer if a biopsy, X-ray, or treatment needs to be done.  During the procedure:  · You lie on an exam table on your back, knees bent and legs apart. You are covered with a drape.  · Your urethra and the area around it are washed. Anesthetic jelly may be applied to numb the urethra. Other pain medicine is usually not needed. In some cases, you may be offered a mild sedative to help you relax. If a more extensive procedure is to be done, such as a biopsy or kidney stone removal, general anesthesia may be needed.  · The cystoscope is inserted. A sterile fluid is put  into the bladder to expand it. You may feel pressure from this fluid.  · When the procedure is done, the cystoscope is removed.  After the procedure  If you had a sedative, general anesthesia, or spinal anesthesia, you must have someone drive you home. Once youre home:  · Drink plenty of fluids.  · You may have burning or light bleeding when you urinate--this is normal.  · Medicines may be prescribed to ease any discomfort or prevent infection. Take these as directed.  · Call your doctor if you have heavy bleeding or blood clots, burning that lasts more than a day, a fever over 100°F  (38° C), or trouble urinating.  Date Last Reviewed: 1/1/2017  © 6284-7763 [employer]. [address] [place] [address], [place] [address]. All rights reserved. This information is not intended as a substitute for professional medical care. Always follow your healthcare professional's instructions.

## 2018-09-25 NOTE — PROGRESS NOTES
"Subjective:       Patient ID: Xu Bonilla is a 23 y.o. male.    Chief Complaint: Fistula (consult)      HPI: Xu Bonilla is a 23 y.o. White male who presents today for evaluation and management of a urethral fistula s/p sex reassignment surgery in August 2018.    The patient recently underwent gender reassignment surgery from a female to a male in [place]. He recently returned home and has been having draining from his perineum where likely his urethra and jovani-urethra were anastomosed. Per the patient, he failed multiple voiding trials. He still has a suprapubic tube in place which appears to be managing his drainage adequately.    The patient is still in significant discomfort in his perineum related to the fistula.    He presents today to discuss how best to manage his likely urethral fistula.    Review of patient's allergies indicates:   Allergen Reactions    Lortab [hydrocodone-acetaminophen] Other (See Comments)     Cannot sleep - gets "wired"       Current Outpatient Medications   Medication Sig Dispense Refill    dextroamphetamine-amphetamine (ADDERALL) 20 mg tablet Take 20 mg by mouth.      multivitamin capsule Take 1 capsule by mouth once daily.      oxybutynin (DITROPAN) 5 MG Tab       testosterone cypionate (DEPOTESTOTERONE CYPIONATE) 100 mg/mL injection Inject 150 mg into the muscle every 14 (fourteen) days.       No current facility-administered medications for this visit.        History reviewed. No pertinent past medical history.    Past Surgical History:   Procedure Laterality Date    bilateral subcutaneous mastectomy  2015    HYSTERECTOMY      phalloplasty  08/06/2018    TONSILLECTOMY  2017       Family History   Problem Relation Age of Onset    No Known Problems Father     Personality disorder Mother     Bipolar disorder Mother        Review of Systems    Review of Systems   Constitutional: Negative for fever, chills, activity change, appetite change and unexpected weight change. "   HENT: Negative for congestion, nosebleeds, sneezing, sore throat and trouble swallowing.    Eyes: Negative for pain, discharge, redness and visual disturbance.   Respiratory: Negative for cough, choking, chest tightness and shortness of breath.    Cardiovascular: Negative for chest pain, palpitations and leg swelling.   Gastrointestinal: Negative for nausea, vomiting, abdominal pain, diarrhea, blood in stool, abdominal distention and anal bleeding.  Genitourinary: As documented per HPI   Endocrine: Negative for cold intolerance, heat intolerance, polydipsia, polyphagia and polyuria.   Musculoskeletal: Negative for myalgias, gait problem, neck pain and neck stiffness.   Skin: Negative for color change, pallor, rash and wound.   Allergic/Immunologic: Negative for immunocompromised state.   Neurological: Negative for seizures, facial asymmetry, speech difficulty, weakness and light-headedness.   Hematological: Negative for adenopathy. Does not bruise/bleed easily.   Psychiatric/Behavioral: Negative for hallucinations, behavioral problems, self-injury and agitation. The patient is not hyperactive.      All other systems were reviewed and were negative.    Objective:     Vitals:    09/24/18 0833   BP: 111/71   Pulse: 61   Resp: 15     Physical Exam   Vitals reviewed.  Constitutional: He is oriented to person, place, and time. He appears well-developed and well-nourished. No distress.   HENT:   Head: Normocephalic and atraumatic.   Right Ear: External ear normal.   Left Ear: External ear normal.   Nose: Nose normal.   Eyes: EOM are normal. Pupils are equal, round, and reactive to light. Right eye exhibits no discharge. Left eye exhibits no discharge.   Neck: Normal range of motion. Neck supple. No tracheal deviation present. No thyroid enlargement or tenderness.   Cardiovascular: Regular rhythm and intact distal pulses. No signs of peripheral edema.   Pulmonary/Chest: Effort normal and breath sounds normal. No stridor.  No respiratory distress. He has no wheezes.   Abdominal: Soft. Bowel sounds are normal. He exhibits no distension. There is no tenderness. Hernia confirmed negative in the right inguinal area and confirmed negative in the left inguinal area. No hepatic or splenic enlargement or tenderness.  Genitourinary: Large marc-phallus with ventral stipling of the skin. Distal marc-meatus. Marc-scrotum made of labia, empty. Disruption of perineal skin at likely urethra/macr-urethra anastomosis.  NGUYEN: Deferred.  Musculoskeletal: Normal range of motion. He exhibits no edema.   Neurological: He is alert and oriented to person, place, and time. He exhibits normal muscle tone. Coordination normal.   Skin: Skin is warm. No rash noted.   Lymphatic: No palpable lymph nodes.  Psychiatric: He has a normal mood and affect. His behavior is normal. Judgment and thought content normal.     No results found for: PSA  Lab Results   Component Value Date    CREATININE 1.0 09/08/2018     Lab Results   Component Value Date    EGFRNONAA >60.0 09/08/2018     Lab Results   Component Value Date    ESTGFRAFRICA >60.0 09/08/2018     I have personally reviewed all the patient's relevant  imaging.    Assessment:       1. Urethral fistula    2. Transgender, S/P sex reassignment surgery        Plan:     Xu was seen today for fistula.    Diagnoses and all orders for this visit:    Urethral fistula  -     Diet NPO; Standing  -     Case Request Operating Room: CYSTOSCOPY, FLEXIBLE, POSSIBLE ANTEGRADE CYSTOSCOPY  -     Place in Outpatient; Standing  -     Reason for no VTE Prophylaxis; Standing    Transgender, S/P sex reassignment surgery  -     Diet NPO; Standing  -     Case Request Operating Room: CYSTOSCOPY, FLEXIBLE, POSSIBLE ANTEGRADE CYSTOSCOPY  -     Place in Outpatient; Standing  -     Reason for no VTE Prophylaxis; Standing    The patient was also seen by Dr. Holm from plastic surgery today.    It appears that the patient has a fistula. Ideally,  it would be best to help with healing if we could place a Desai catheter through his jovani-phallus into his bladder. We need to start evaluation with flexible cystoscopy via his jovani-urethra to determine if there is a stricture. Once we have a diagnostic evaluation done, we can determine how best to proceed.    We explained our thought process to the patient who was agreeable.    We answered all his questions.    I encouraged him or any of his family members to call or email me with questions and/or concerns.    I spent 60 minutes with the patient of which more than half was spent in coordinating the patient's care as well as in direct consultation with the patient in regards to our treatment and plan.

## 2018-09-25 NOTE — H&P (VIEW-ONLY)
"Subjective:       Patient ID: Xu Bonilla is a 23 y.o. male.    Chief Complaint: Fistula (consult)      HPI: Xu Bonilla is a 23 y.o. White male who presents today for evaluation and management of a urethral fistula s/p sex reassignment surgery in August 2018.    The patient recently underwent gender reassignment surgery from a female to a male in [place]. He recently returned home and has been having draining from his perineum where likely his urethra and jovani-urethra were anastomosed. Per the patient, he failed multiple voiding trials. He still has a suprapubic tube in place which appears to be managing his drainage adequately.    The patient is still in significant discomfort in his perineum related to the fistula.    He presents today to discuss how best to manage his likely urethral fistula.    Review of patient's allergies indicates:   Allergen Reactions    Lortab [hydrocodone-acetaminophen] Other (See Comments)     Cannot sleep - gets "wired"       Current Outpatient Medications   Medication Sig Dispense Refill    dextroamphetamine-amphetamine (ADDERALL) 20 mg tablet Take 20 mg by mouth.      multivitamin capsule Take 1 capsule by mouth once daily.      oxybutynin (DITROPAN) 5 MG Tab       testosterone cypionate (DEPOTESTOTERONE CYPIONATE) 100 mg/mL injection Inject 150 mg into the muscle every 14 (fourteen) days.       No current facility-administered medications for this visit.        History reviewed. No pertinent past medical history.    Past Surgical History:   Procedure Laterality Date    bilateral subcutaneous mastectomy  2015    HYSTERECTOMY      phalloplasty  08/06/2018    TONSILLECTOMY  2017       Family History   Problem Relation Age of Onset    No Known Problems Father     Personality disorder Mother     Bipolar disorder Mother        Review of Systems    Review of Systems   Constitutional: Negative for fever, chills, activity change, appetite change and unexpected weight change. "   HENT: Negative for congestion, nosebleeds, sneezing, sore throat and trouble swallowing.    Eyes: Negative for pain, discharge, redness and visual disturbance.   Respiratory: Negative for cough, choking, chest tightness and shortness of breath.    Cardiovascular: Negative for chest pain, palpitations and leg swelling.   Gastrointestinal: Negative for nausea, vomiting, abdominal pain, diarrhea, blood in stool, abdominal distention and anal bleeding.  Genitourinary: As documented per HPI   Endocrine: Negative for cold intolerance, heat intolerance, polydipsia, polyphagia and polyuria.   Musculoskeletal: Negative for myalgias, gait problem, neck pain and neck stiffness.   Skin: Negative for color change, pallor, rash and wound.   Allergic/Immunologic: Negative for immunocompromised state.   Neurological: Negative for seizures, facial asymmetry, speech difficulty, weakness and light-headedness.   Hematological: Negative for adenopathy. Does not bruise/bleed easily.   Psychiatric/Behavioral: Negative for hallucinations, behavioral problems, self-injury and agitation. The patient is not hyperactive.      All other systems were reviewed and were negative.    Objective:     Vitals:    09/24/18 0833   BP: 111/71   Pulse: 61   Resp: 15     Physical Exam   Vitals reviewed.  Constitutional: He is oriented to person, place, and time. He appears well-developed and well-nourished. No distress.   HENT:   Head: Normocephalic and atraumatic.   Right Ear: External ear normal.   Left Ear: External ear normal.   Nose: Nose normal.   Eyes: EOM are normal. Pupils are equal, round, and reactive to light. Right eye exhibits no discharge. Left eye exhibits no discharge.   Neck: Normal range of motion. Neck supple. No tracheal deviation present. No thyroid enlargement or tenderness.   Cardiovascular: Regular rhythm and intact distal pulses. No signs of peripheral edema.   Pulmonary/Chest: Effort normal and breath sounds normal. No stridor.  No respiratory distress. He has no wheezes.   Abdominal: Soft. Bowel sounds are normal. He exhibits no distension. There is no tenderness. Hernia confirmed negative in the right inguinal area and confirmed negative in the left inguinal area. No hepatic or splenic enlargement or tenderness.  Genitourinary: Large marc-phallus with ventral stipling of the skin. Distal marc-meatus. Marc-scrotum made of labia, empty. Disruption of perineal skin at likely urethra/marc-urethra anastomosis.  NGUYEN: Deferred.  Musculoskeletal: Normal range of motion. He exhibits no edema.   Neurological: He is alert and oriented to person, place, and time. He exhibits normal muscle tone. Coordination normal.   Skin: Skin is warm. No rash noted.   Lymphatic: No palpable lymph nodes.  Psychiatric: He has a normal mood and affect. His behavior is normal. Judgment and thought content normal.     No results found for: PSA  Lab Results   Component Value Date    CREATININE 1.0 09/08/2018     Lab Results   Component Value Date    EGFRNONAA >60.0 09/08/2018     Lab Results   Component Value Date    ESTGFRAFRICA >60.0 09/08/2018     I have personally reviewed all the patient's relevant  imaging.    Assessment:       1. Urethral fistula    2. Transgender, S/P sex reassignment surgery        Plan:     Xu was seen today for fistula.    Diagnoses and all orders for this visit:    Urethral fistula  -     Diet NPO; Standing  -     Case Request Operating Room: CYSTOSCOPY, FLEXIBLE, POSSIBLE ANTEGRADE CYSTOSCOPY  -     Place in Outpatient; Standing  -     Reason for no VTE Prophylaxis; Standing    Transgender, S/P sex reassignment surgery  -     Diet NPO; Standing  -     Case Request Operating Room: CYSTOSCOPY, FLEXIBLE, POSSIBLE ANTEGRADE CYSTOSCOPY  -     Place in Outpatient; Standing  -     Reason for no VTE Prophylaxis; Standing    The patient was also seen by Dr. Holm from plastic surgery today.    It appears that the patient has a fistula. Ideally,  it would be best to help with healing if we could place a Desai catheter through his jovani-phallus into his bladder. We need to start evaluation with flexible cystoscopy via his jovani-urethra to determine if there is a stricture. Once we have a diagnostic evaluation done, we can determine how best to proceed.    We explained our thought process to the patient who was agreeable.    We answered all his questions.    I encouraged him or any of his family members to call or email me with questions and/or concerns.    I spent 60 minutes with the patient of which more than half was spent in coordinating the patient's care as well as in direct consultation with the patient in regards to our treatment and plan.

## 2018-09-27 ENCOUNTER — TELEPHONE (OUTPATIENT)
Dept: UROLOGY | Facility: CLINIC | Age: 23
End: 2018-09-27

## 2018-09-27 ENCOUNTER — ANESTHESIA EVENT (OUTPATIENT)
Dept: SURGERY | Facility: HOSPITAL | Age: 23
End: 2018-09-27
Payer: COMMERCIAL

## 2018-09-27 NOTE — PRE ADMISSION SCREENING
Anesthesia Assessment: Preoperative EQUATION    Planned Procedure: Procedure(s) (LRB):  CYSTOSCOPY, FLEXIBLE, POSSIBLE ANTEGRADE CYSTOSCOPY (N/A)  Requested Anesthesia Type:General/MAC  Surgeon: Alfredito Holland MD  Service: Urology  Known or anticipated Date of Surgery:10/4/2018    Surgeon notes: reviewed    Electronic QUestionnaire Assessment completed via nurse interview with patient.      NO AQ    Triage considerations:     The patient has no apparent active cardiac condition (No unstable coronary Syndrome such as severe unstable angina or recent [<1 month] myocardial infarction, decompensated CHF, severe valvular   disease or significant arrhythmia)    Previous anesthesia records:GETA, MAC, No problems and Not available    Last PCP note: outside [facility]   Subspecialty notes: Endocrinology    Other important co-morbidities: none     Tests already available:  Available tests,  within 1 month . 9/8/18 EKG, CMP, CBC.            Instructions given. (See in Nurse's note)    Optimization:  Anesthesia Preop Clinic Assessment  Indicated-not required for this procedure      Plan:    Testing:  none     Patient  has previously scheduled Medical Appointment: none    Navigation:                Straight Line to surgery.               No tests, anesthesia preop clinic visit, or consult required.

## 2018-10-01 ENCOUNTER — TELEPHONE (OUTPATIENT)
Dept: UROLOGY | Facility: CLINIC | Age: 23
End: 2018-10-01

## 2018-10-01 NOTE — TELEPHONE ENCOUNTER
Called pt to confirm arrival time of 12pm for procedure on 10/2/2018. Gave pt NPO instructions and gave pt opportunity to ask questions. Pt verbalized understanding.

## 2018-10-02 ENCOUNTER — HOSPITAL ENCOUNTER (OUTPATIENT)
Facility: HOSPITAL | Age: 23
Discharge: HOME | End: 2018-10-02
Attending: UROLOGY | Admitting: UROLOGY
Payer: COMMERCIAL

## 2018-10-02 VITALS
RESPIRATION RATE: 25 BRPM | HEART RATE: 67 BPM | SYSTOLIC BLOOD PRESSURE: 101 MMHG | WEIGHT: 170 LBS | HEIGHT: 68 IN | BODY MASS INDEX: 25.76 KG/M2 | DIASTOLIC BLOOD PRESSURE: 61 MMHG | OXYGEN SATURATION: 98 % | TEMPERATURE: 98 F

## 2018-10-02 DIAGNOSIS — N36.0 URINARY FISTULA: Primary | ICD-10-CM

## 2018-10-02 DIAGNOSIS — L98.8 FISTULA: ICD-10-CM

## 2018-10-02 DIAGNOSIS — Z87.890 TRANSGENDER, S/P SEX REASSIGNMENT SURGERY: ICD-10-CM

## 2018-10-02 DIAGNOSIS — N36.0 URETHRAL FISTULA: ICD-10-CM

## 2018-10-02 PROCEDURE — 87077 CULTURE AEROBIC IDENTIFY: CPT

## 2018-10-02 PROCEDURE — 63600175 PHARM REV CODE 636 W HCPCS: Performed by: NURSE ANESTHETIST, CERTIFIED REGISTERED

## 2018-10-02 PROCEDURE — C1769 GUIDE WIRE: HCPCS | Performed by: UROLOGY

## 2018-10-02 PROCEDURE — 87088 URINE BACTERIA CULTURE: CPT

## 2018-10-02 PROCEDURE — 63600175 PHARM REV CODE 636 W HCPCS: Performed by: STUDENT IN AN ORGANIZED HEALTH CARE EDUCATION/TRAINING PROGRAM

## 2018-10-02 PROCEDURE — 71000044 HC DOSC ROUTINE RECOVERY FIRST HOUR: Performed by: UROLOGY

## 2018-10-02 PROCEDURE — 25000003 PHARM REV CODE 250: Performed by: STUDENT IN AN ORGANIZED HEALTH CARE EDUCATION/TRAINING PROGRAM

## 2018-10-02 PROCEDURE — C1758 CATHETER, URETERAL: HCPCS | Performed by: UROLOGY

## 2018-10-02 PROCEDURE — 37000009 HC ANESTHESIA EA ADD 15 MINS: Performed by: UROLOGY

## 2018-10-02 PROCEDURE — 63600175 PHARM REV CODE 636 W HCPCS: Performed by: ANESTHESIOLOGY

## 2018-10-02 PROCEDURE — 36000707: Performed by: UROLOGY

## 2018-10-02 PROCEDURE — 71000016 HC POSTOP RECOV ADDL HR: Performed by: UROLOGY

## 2018-10-02 PROCEDURE — D9220A PRA ANESTHESIA: Mod: ANES,,, | Performed by: ANESTHESIOLOGY

## 2018-10-02 PROCEDURE — 71000015 HC POSTOP RECOV 1ST HR: Performed by: UROLOGY

## 2018-10-02 PROCEDURE — 87086 URINE CULTURE/COLONY COUNT: CPT

## 2018-10-02 PROCEDURE — D9220A PRA ANESTHESIA: Mod: CRNA,,, | Performed by: NURSE ANESTHETIST, CERTIFIED REGISTERED

## 2018-10-02 PROCEDURE — 27201423 OPTIME MED/SURG SUP & DEVICES STERILE SUPPLY: Performed by: UROLOGY

## 2018-10-02 PROCEDURE — 37000008 HC ANESTHESIA 1ST 15 MINUTES: Performed by: UROLOGY

## 2018-10-02 PROCEDURE — 51710 CHANGE OF BLADDER TUBE: CPT | Mod: ,,, | Performed by: UROLOGY

## 2018-10-02 PROCEDURE — 36000706: Performed by: UROLOGY

## 2018-10-02 PROCEDURE — 87186 SC STD MICRODIL/AGAR DIL: CPT

## 2018-10-02 PROCEDURE — 74430 CONTRAST X-RAY BLADDER: CPT | Mod: 26,59,, | Performed by: UROLOGY

## 2018-10-02 PROCEDURE — 51600 INJECTION FOR BLADDER X-RAY: CPT | Mod: 51,,, | Performed by: UROLOGY

## 2018-10-02 RX ORDER — SODIUM CHLORIDE 9 MG/ML
INJECTION, SOLUTION INTRAVENOUS CONTINUOUS
Status: DISCONTINUED | OUTPATIENT
Start: 2018-10-02 | End: 2018-10-02 | Stop reason: HOSPADM

## 2018-10-02 RX ORDER — FENTANYL CITRATE 50 UG/ML
INJECTION, SOLUTION INTRAMUSCULAR; INTRAVENOUS
Status: DISCONTINUED | OUTPATIENT
Start: 2018-10-02 | End: 2018-10-02

## 2018-10-02 RX ORDER — MIDAZOLAM HYDROCHLORIDE 1 MG/ML
INJECTION, SOLUTION INTRAMUSCULAR; INTRAVENOUS
Status: DISCONTINUED | OUTPATIENT
Start: 2018-10-02 | End: 2018-10-02

## 2018-10-02 RX ORDER — FLUCONAZOLE 2 MG/ML
200 INJECTION, SOLUTION INTRAVENOUS
Status: DISCONTINUED | OUTPATIENT
Start: 2018-10-02 | End: 2018-10-02 | Stop reason: HOSPADM

## 2018-10-02 RX ORDER — LIDOCAINE HCL/PF 100 MG/5ML
SYRINGE (ML) INTRAVENOUS
Status: DISCONTINUED | OUTPATIENT
Start: 2018-10-02 | End: 2018-10-02

## 2018-10-02 RX ORDER — SODIUM CHLORIDE 0.9 % (FLUSH) 0.9 %
3 SYRINGE (ML) INJECTION
Status: DISCONTINUED | OUTPATIENT
Start: 2018-10-02 | End: 2018-10-02 | Stop reason: HOSPADM

## 2018-10-02 RX ORDER — OXYBUTYNIN CHLORIDE 5 MG/1
10 TABLET ORAL 2 TIMES DAILY
Qty: 30 TABLET | Refills: 6 | Status: SHIPPED | OUTPATIENT
Start: 2018-10-02 | End: 2018-10-09

## 2018-10-02 RX ORDER — FENTANYL CITRATE 50 UG/ML
25 INJECTION, SOLUTION INTRAMUSCULAR; INTRAVENOUS EVERY 5 MIN PRN
Status: DISCONTINUED | OUTPATIENT
Start: 2018-10-02 | End: 2018-10-02 | Stop reason: HOSPADM

## 2018-10-02 RX ORDER — LIDOCAINE HYDROCHLORIDE 10 MG/ML
1 INJECTION, SOLUTION EPIDURAL; INFILTRATION; INTRACAUDAL; PERINEURAL ONCE
Status: DISCONTINUED | OUTPATIENT
Start: 2018-10-02 | End: 2018-10-02 | Stop reason: HOSPADM

## 2018-10-02 RX ORDER — VANCOMYCIN HCL IN 5 % DEXTROSE 1G/250ML
1000 PLASTIC BAG, INJECTION (ML) INTRAVENOUS
Status: COMPLETED | OUTPATIENT
Start: 2018-10-02 | End: 2018-10-02

## 2018-10-02 RX ORDER — OXYCODONE AND ACETAMINOPHEN 5; 325 MG/1; MG/1
1 TABLET ORAL EVERY 4 HOURS PRN
Qty: 7 TABLET | Refills: 0 | Status: SHIPPED | OUTPATIENT
Start: 2018-10-02 | End: 2019-02-25

## 2018-10-02 RX ORDER — PROPOFOL 10 MG/ML
VIAL (ML) INTRAVENOUS CONTINUOUS PRN
Status: DISCONTINUED | OUTPATIENT
Start: 2018-10-02 | End: 2018-10-02

## 2018-10-02 RX ORDER — ONDANSETRON 2 MG/ML
4 INJECTION INTRAMUSCULAR; INTRAVENOUS ONCE AS NEEDED
Status: DISCONTINUED | OUTPATIENT
Start: 2018-10-02 | End: 2018-10-02 | Stop reason: HOSPADM

## 2018-10-02 RX ORDER — CEFAZOLIN SODIUM 1 G/3ML
2 INJECTION, POWDER, FOR SOLUTION INTRAMUSCULAR; INTRAVENOUS
Status: DISCONTINUED | OUTPATIENT
Start: 2018-10-02 | End: 2018-10-02

## 2018-10-02 RX ORDER — ONDANSETRON 8 MG/1
8 TABLET, ORALLY DISINTEGRATING ORAL EVERY 8 HOURS PRN
Status: DISCONTINUED | OUTPATIENT
Start: 2018-10-02 | End: 2018-10-02 | Stop reason: HOSPADM

## 2018-10-02 RX ORDER — AMOXICILLIN AND CLAVULANATE POTASSIUM 500; 125 MG/1; MG/1
1 TABLET, FILM COATED ORAL 2 TIMES DAILY
Qty: 14 TABLET | Refills: 0 | Status: SHIPPED | OUTPATIENT
Start: 2018-10-02 | End: 2018-10-09

## 2018-10-02 RX ADMIN — FLUCONAZOLE IN SODIUM CHLORIDE 200 MG: 2 INJECTION, SOLUTION INTRAVENOUS at 01:10

## 2018-10-02 RX ADMIN — SODIUM CHLORIDE: 0.9 INJECTION, SOLUTION INTRAVENOUS at 01:10

## 2018-10-02 RX ADMIN — FENTANYL CITRATE 25 MCG: 50 INJECTION INTRAMUSCULAR; INTRAVENOUS at 03:10

## 2018-10-02 RX ADMIN — VANCOMYCIN HYDROCHLORIDE 1000 MG: 1 INJECTION, POWDER, LYOPHILIZED, FOR SOLUTION INTRAVENOUS at 01:10

## 2018-10-02 RX ADMIN — MIDAZOLAM HYDROCHLORIDE 4 MG: 1 INJECTION, SOLUTION INTRAMUSCULAR; INTRAVENOUS at 01:10

## 2018-10-02 RX ADMIN — FENTANYL CITRATE 100 MCG: 50 INJECTION, SOLUTION INTRAMUSCULAR; INTRAVENOUS at 01:10

## 2018-10-02 RX ADMIN — PROPOFOL 100 MCG/KG/MIN: 10 INJECTION, EMULSION INTRAVENOUS at 01:10

## 2018-10-02 RX ADMIN — LIDOCAINE HYDROCHLORIDE 100 MG: 20 INJECTION, SOLUTION INTRAVENOUS at 01:10

## 2018-10-02 RX ADMIN — GENTAMICIN SULFATE 160 MG: 40 INJECTION, SOLUTION INTRAMUSCULAR; INTRAVENOUS at 01:10

## 2018-10-02 NOTE — INTERVAL H&P NOTE
The patient has been examined and the H&P has been reviewed:    I concur with the findings and no changes have occurred since H&P was written.     Patients urine is + for nitrites, blood and leuks, but he is likely colonized. We will double cover with antibiotics and will likely need to change his sp catheter if possible    Anesthesia/Surgery risks, benefits and alternative options discussed and understood by patient/family.          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

## 2018-10-02 NOTE — DISCHARGE INSTRUCTIONS
"  Discharge Instructions: Caring for Your Suprapubic Catheter  You are going home with a suprapubic catheter in place. This tube is placed directly into the bladder through your abdomen to drain urine from your bladder. You were shown how to care for your catheter in the hospital. This sheet will help remind you of those steps and guidelines when you are at home.  Home care  · Shower as necessary.   · Change your dressing every day. Change the dressing more often if it falls off, becomes dirty, or has absorbed a lot of drainage.  Gather your supplies  · Tape  · Povidone-iodine ointment  · Cotton swabs  · Wastebasket and plastic bag  · Povidone-iodine swab sticks (or cotton balls and povidone-iodine solution)  · Dressing sponges (4" x 4") that are cut or split longterm into the middle  Remove the dressing and check for problems  · Wash your hands thoroughly before and after all catheter care.  · Gently remove the old dressing if you have one.  ¨ Dont pull on the tube.  ¨ Check the dressing for drainage. Notice whether anything looks unusual or smells bad.  ¨ Place your dressing in the plastic bag and throw it away in the wastebasket.  · Now look at the place where the catheter leaves your body (exit site).  ¨ Note any swelling, bleeding, irritation, unusual or smelly drainage.  ¨ Also check for any sores next to the exit site. Sores form around the exit site if there is too much pressure from the tube on the skin.  Clean the area  · Wash around the shield gently with soap and water.  · Use a povidone-iodine swab stick to clean under the shield.  ¨ Clean around the exit site of the catheter.  ¨ Start at the exit site and clean outward in a circular motion, about 3 to 4 inches from the site.Dont clean back toward the tube.  ¨ Throw away the used swab stick and repeat the cleaning procedure with a new one.  ¨ Let your skin dry completely.  · Place a split 4" x 4" sponge around the catheter. Tape it in place.  · Smear a " thin layer of povidone-iodine ointment around the catheter with a cotton swab.  Follow-up care  Make a follow-up appointment or as advised.  When to call your healthcare provider  Call your health care provider right away if you have any of the following:  · Catheter that falls out, or is clogged or feels clogged  · Stitches that fall out  · Urine leaking around catheter  · Urine that is cloudy, bloody, or smells bad  · No urine drainage  · Bladder that feels full or painful  · Rash, itching, redness, swelling, or drainage at the catheter site  · Fever above 100.4°F (38.°C) or shaking chills   Date Last Reviewed: 1/1/2017 © 2000-2017 [employer]. [address] [place] [address], [place] [address]. All rights reserved. This information is not intended as a substitute for professional medical care. Always follow your healthcare professional's instructions.        Anesthesia: General Anesthesia     You are watched continuously during your procedure by your anesthesia provider.     Youre due to have surgery. During surgery, youll be given medicine called anesthesia or anesthetic. This will keep you comfortable and pain-free. Your anesthesia provider will use general anesthesia.  What is general anesthesia?  General anesthesia puts you into a state like deep sleep. It goes into the bloodstream (IV anesthetics), into the lungs (gas anesthetics), or both. You feel nothing during the procedure. You will not remember it. During the procedure, the anesthesia provider monitors you continuously. He or she checks your heart rate and rhythm, blood pressure, breathing, and blood oxygen.  · IV anesthetics. IV anesthetics are given through an IV line in your arm. Theyre often given first. This is so you are asleep before a gas anesthetic is started. Some kinds of IV anesthetics relieve pain. Others relax you. Your doctor will decide which kind is best in your case.  · Gas anesthetics. Gas anesthetics are breathed into the  lungs. They are often used to keep you asleep. They can be given through a facemask or a tube placed in your larynx or trachea (breathing tube).  ¨ If you have a facemask, your anesthesia provider will most likely place it over your nose and mouth while youre still awake. Youll breathe oxygen through the mask as your IV anesthetic is started. Gas anesthetic may be added through the mask.  ¨ If you have a tube in the larynx or trachea, it will be inserted into your throat after youre asleep.  Anesthesia tools and medicines  You will likely have:  · IV anesthetics. These are put into an IV line into your bloodstream.  · Gas anesthetics. You breathe these anesthetics into your lungs, where they pass into your bloodstream.  · Pulse oximeter. This is a small clip that is attached to the end of your finger. This measures your blood oxygen level.  · Electrocardiography leads (electrodes). These are small sticky pads that are placed on your chest. They record your heart rate and rhythm.  · Blood pressure cuff. This reads your blood pressure.  Risks and possible complications  General anesthesia has some risks. These include:  · Breathing problems  · Nausea and vomiting  · Sore throat or hoarseness (usually temporary)  · Allergic reaction to the anesthetic  · Irregular heartbeat (rare)  · Cardiac arrest (rare)   Anesthesia safety  · Follow all instructions you are given for how long not to eat or drink before your procedure.  · Be sure your doctor knows what medicines and drugs you take. This includes over-the-counter medicines, herbs, supplements, alcohol or other drugs. You will be asked when those were last taken.  · Have an adult family member or friend drive you home after the procedure.  · For the first 24 hours after your surgery:  ¨ Do not drive or use heavy equipment.  ¨ Do not make important decisions or sign legal documents. If important decisions or signing legal documents is necessary during the first 24  hours after surgery, have a trusted family member or spouse act on your behalf.  ¨ Avoid alcohol.  ¨ Have a responsible adult stay with you. He or she can watch for problems and help keep you safe.  Date Last Reviewed: 12/1/2016 © 2000-2017 [employer]. [address] [place] [address]. All rights reserved. This information is not intended as a substitute for professional medical care. Always follow your healthcare professional's instructions.        Cystoscopy    Cystoscopy is a procedure that lets your doctor look directly inside your urethra and bladder. It can be used to:  · Help diagnose a problem with your urethra, bladder, or kidneys.  · Take a sample (biopsy) of bladder or urethral tissue.  · Treat certain problems (such as removing kidney stones).  · Place a stent to bypass an obstruction.  · Take special X-rays of the kidneys.  Based on the findings, your doctor may recommend other tests or treatments.  What is a cystoscope?  A cystoscope is a telescope-like instrument that contains lenses and fiberoptics (small glass wires that make bright light). The cystoscope may be straight and rigid, or flexible to bend around curves in the urethra. The doctor may look directly into the cystoscope, or project the image onto a monitor.  Getting ready  · Ask your doctor if you should stop taking any medicines before the procedure.  · Ask whether you should avoid eating or drinking anything after midnight before the procedure.  · Follow any other instructions your doctor gives you.  Tell your doctor before the exam if you:  · Take any medicines, such as aspirin or blood thinners  · Have allergies to any medicines  · Are pregnant   The procedure  Cystoscopy is done in the doctors office, surgery center, or hospital. The doctor and a nurse are present during the procedure. It takes only a few minutes, longer if a biopsy, X-ray, or treatment needs to be done.  During the procedure:  · You lie on an  exam table on your back, knees bent and legs apart. You are covered with a drape.  · Your urethra and the area around it are washed. Anesthetic jelly may be applied to numb the urethra. Other pain medicine is usually not needed. In some cases, you may be offered a mild sedative to help you relax. If a more extensive procedure is to be done, such as a biopsy or kidney stone removal, general anesthesia may be needed.  · The cystoscope is inserted. A sterile fluid is put into the bladder to expand it. You may feel pressure from this fluid.  · When the procedure is done, the cystoscope is removed.  After the procedure  If you had a sedative, general anesthesia, or spinal anesthesia, you must have someone drive you home. Once youre home:  · Drink plenty of fluids.  · You may have burning or light bleeding when you urinate--this is normal.  · Medicines may be prescribed to ease any discomfort or prevent infection. Take these as directed.  · Call your doctor if you have heavy bleeding or blood clots, burning that lasts more than a day, a fever over 100°F  (38° C), or trouble urinating.  Date Last Reviewed: 1/1/2017  © 9687-6839 [employer]. [address] [place] [address], [place] [address]. All rights reserved. This information is not intended as a substitute for professional medical care. Always follow your healthcare professional's instructions.

## 2018-10-02 NOTE — ANESTHESIA POSTPROCEDURE EVALUATION
"Anesthesia Post Evaluation    Patient: Xu Bonilla    Procedure(s) Performed: Procedure(s) (LRB):  CYSTOSCOPY, FLEXIBLE, ANTEGRADE CYSTOSCOPY (N/A)  CYSTOGRAM  SUPRAPUBIC CATHETER EXCHANGE  CATHETERIZATION, URETHRA -COMPLICATED    Final Anesthesia Type: general  Patient location during evaluation: PACU  Patient participation: Yes- Able to Participate  Level of consciousness: awake and alert and oriented  Post-procedure vital signs: reviewed and stable  Pain management: adequate  Airway patency: patent  PONV status at discharge: No PONV  Anesthetic complications: no      Cardiovascular status: hemodynamically stable  Respiratory status: unassisted  Hydration status: euvolemic  Follow-up not needed.        Visit Vitals  BP (!) 94/51 (BP Location: Left arm, Patient Position: Lying)   Pulse 61   Temp 36.6 °C (97.9 °F) (Temporal)   Resp 18   Ht 5' 8" (1.727 m)   Wt 77.1 kg (170 lb)   SpO2 98%   BMI 25.85 kg/m²       Pain/Maxine Score: Pain Assessment Performed: Yes (10/2/2018  2:50 PM)  Presence of Pain: complains of pain/discomfort (10/2/2018  2:50 PM)  Pain Rating Prior to Med Admin: 7 (10/2/2018  3:01 PM)  Maxine Score: 9 (10/2/2018  2:50 PM)        "

## 2018-10-02 NOTE — PLAN OF CARE
Patient tolerated procedure well.  C/O pain well controlled with interventions.  Prepared for D/C.  Instructions include S/S of complications, when to seek medical attention, F/U appts, aftercare.  Desai and suprapubic catheter site care, pain interventions.  Patient verbalized understanding.  Dr. Garza met with patient at bedside to answer questions about capping catheters and adjusting oxybutin dose.  Peripheral IV removed prior to discharge.

## 2018-10-02 NOTE — OP NOTE
[facility]  Operative Note    Date: 10/02/2018    Pre-Op Diagnosis:   1. Urethral fistula  2. S/P transgender reassignment surgery with creation of neophallus    Post-Op Diagnosis: same    Procedure(s) Performed:   1.  Retrograde and antegrade cystoscopy   2.  Cystogram  3.  Complicated placement of fonseca catheter  4.  Suprapubic catheter exchange    Specimen(s): none    Staff Surgeon: Alfredito Holland MD    Assistant Surgeon: Amy Bhat MD; Denton Garza MD    Anesthesia: Monitored Local Anesthesia with Sedation    Indications: Xu Bonilla is a 23 y.o. 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 He has been continuously leaking from this site. He currently has an SPT in place which is clamped and unclamped intermittently to void.     Findings:   - Large urethrocutaneous fistula over at the penoscrotal junction, able to visualize catheter easily after placement  - 2 smaller fistulas along the ventral aspect of the shaft along the incision line  - Hair noted along the length of the neourethra   - Unable to pass 15 Fr flexible scope through area of the urethra just distal to the penoscrotal fistula  - Scope placed through SPT track and into urethra. There was breakdown at the the urethral-neourethra anastomosis with suture visible, a few false passages were seen. Again we were unable to scope past the fistula.     Estimated Blood Loss: min    Drains:   - 12 Fr silicone urethral fonseca  - 16 Fr suprapubic tube    Procedure in Detail:  After risks, benefits and possible complications of cystoscopy were explained, the patient elected to undergo the procedure and informed consent was obtained. All questions were answered in the terrance-operative area. The patient was transferred to the cystoscopy suite and placed in the supine position.  SCDs were applied and working.  Anesthesia was administered.   Once the patient was adequately sedated, he was placed in the dorsal lithotomy position and prepped and draped in the usual sterile fashion.  Time out was performed, terrance-procedural antibiotics were confirmed.     Please see findings as above.     A 15 Fr flexible cystoscope was placed through the jovani urethra. We were unable to transverse the area just distal to the penoscrotal fistula. We attempted to pass a wire however this was unsuccessful. We then removed his previously placed SPT and performed antegrade cystoscopy. The urethra was identified and entered.     There were multiple false passages seen and due to the angle we were unable to enter the true urethra. We then passed a wire through the urethra and visualized it with the cystoscope. A 5 fr open ended catheter was passed over the wire and the wire was removed. The 5 fr open ended catheter was grasped with a stone basket and pulled through the urethra out of the SPT tract. A glide wire was placed and the 5 fr removed. We then passed a 12 Fr silicone catheter over the wire and into the bladder. A cystogram was performed to confirm placement. The wire was removed. We then again performed antegrade cystoscopy to ensure the balloon was within the bladder.     A 16 Fr SPT was then placed. Cystogram was performed to ensure placement. This was clamped.     The patient tolerated the procedure well and was transferred to recovery in stable condition.    Disposition:  The patient will follow up with Dr. Holland in 3 weeks.  The patient was given prescriptions for percocet and augmentin.        Amy Bhat MD    As the attending surgeon, Dr. Holland was present for the entire procedure and performed all key aspects of the operation.

## 2018-10-02 NOTE — TRANSFER OF CARE
"Anesthesia Transfer of Care Note    Patient: Xu Bonilla    Procedure(s) Performed: Procedure(s) (LRB):  CYSTOSCOPY, FLEXIBLE, ANTEGRADE CYSTOSCOPY (N/A)  CYSTOGRAM  SUPRAPUBIC CATHETER EXCHANGE  CATHETERIZATION, URETHRA -COMPLICATED    Patient location: PACU    Anesthesia Type: general    Transport from OR: Transported from OR on 6-10 L/min O2 by face mask with adequate spontaneous ventilation    Post pain: adequate analgesia    Post assessment: no apparent anesthetic complications and tolerated procedure well    Post vital signs: stable    Level of consciousness: awake, alert and oriented    Nausea/Vomiting: no nausea/vomiting    Complications: none    Transfer of care protocol was followed      Last vitals:   Visit Vitals  /82 (BP Location: Left arm, Patient Position: Lying)   Pulse 66   Temp 37.1 °C (98.8 °F) (Oral)   Resp 18   Ht 5' 8" (1.727 m)   Wt 77.1 kg (170 lb)   SpO2 97%   BMI 25.85 kg/m²     "

## 2018-10-02 NOTE — DISCHARGE SUMMARY
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       Discharged Condition: good    Hospital Course: Patient was admitted for cystoscopy and tolerated the procedure well with no complications. The patient was discharged home in good condition on the same day.       Final Diagnoses: Same as principal problem.    Disposition: Home or Self Care    Follow up/Patient Instructions:    Medications:  Reconciled Home Medications:   Current Discharge Medication List      START taking these medications    Details   amoxicillin-clavulanate 500-125mg (AUGMENTIN) 500-125 mg Tab Take 1 tablet (500 mg total) by mouth 2 (two) times daily. for 7 days  Qty: 14 tablet, Refills: 0      oxyCODONE-acetaminophen (PERCOCET) 5-325 mg per tablet Take 1 tablet by mouth every 4 (four) hours as needed.  Qty: 7 tablet, Refills: 0         CONTINUE these medications which have NOT CHANGED    Details   dextroamphetamine-amphetamine (ADDERALL) 20 mg tablet Take 20 mg by mouth.      multivitamin capsule Take 1 capsule by mouth once daily.      oxybutynin (DITROPAN) 5 MG Tab 3 (three) times daily.       testosterone cypionate (DEPOTESTOTERONE CYPIONATE) 100 mg/mL injection Inject 150 mg into the muscle every 14 (fourteen) days.           Discharge Procedure Orders   Diet general     Call MD for:  temperature >100.4     Call MD for:  persistent nausea and vomiting     Call MD for:  severe uncontrolled pain     No dressing needed     Activity as tolerated     Follow-up Information     Alfredito Holland MD In 3 weeks.    Specialty:  Urology  Why:  post op  Contact  information:  [address]  [facility] [address]  [phone]                     Amy Bhat MD  Urology, PGY- 4  Pager# [phone]

## 2018-10-04 ENCOUNTER — ANESTHESIA (OUTPATIENT)
Dept: SURGERY | Facility: HOSPITAL | Age: 23
End: 2018-10-04
Payer: COMMERCIAL

## 2018-10-04 LAB — BACTERIA UR CULT: NORMAL

## 2018-10-07 ENCOUNTER — NURSE TRIAGE (OUTPATIENT)
Dept: ADMINISTRATIVE | Facility: CLINIC | Age: 23
End: 2018-10-07

## 2018-10-07 NOTE — TELEPHONE ENCOUNTER
Tuesday cystoscopy/Dr. Holland with fonseca catheter placement.  Thinks his fonseca catheter balloon has ruptured.  Now having leakage from the fistula that they were hoping to block with the catheter, and when he tries to draw back on the balloon, he doesn't get any saline back.  Paged urology resident on call, awaiting response.  S/w Wendal in Urology, he will call the patient back to discuss.    Reason for Disposition   Symptoms arising from use of a urinary catheter (Fonseca or Coude)   [1] Catheter is broken AND [2] is not usable    Answer Assessment - Initial Assessment Questions  Tuesday cystoscopy/Dr. Holland with fonseca catheter placement.  Thinks his fonseca catheter balloon has ruptured.  Now having leakage from the fistula that they were hoping to block with the catheter, and when he tries to draw back on the balloon, he doesn't get any saline back.  Pt reports no other symptoms at this time.    Protocols used: ST POST-OP SYMPTOMS AND [id number], ST URINARY CATHETER SYMPTOMS AND [id number]

## 2018-10-09 DIAGNOSIS — N36.0 URINARY FISTULA: Primary | ICD-10-CM

## 2018-10-09 RX ORDER — AMOXICILLIN AND CLAVULANATE POTASSIUM 875; 125 MG/1; MG/1
1 TABLET, FILM COATED ORAL EVERY 12 HOURS
Qty: 20 TABLET | Refills: 0 | Status: SHIPPED | OUTPATIENT
Start: 2018-10-09 | End: 2018-10-19

## 2018-10-11 ENCOUNTER — ANESTHESIA EVENT (OUTPATIENT)
Dept: SURGERY | Facility: HOSPITAL | Age: 23
End: 2018-10-11
Payer: COMMERCIAL

## 2018-10-11 NOTE — PRE ADMISSION SCREENING
Anesthesia Assessment: Preoperative EQUATION    Planned Procedure: Procedure(s) (LRB):  CYSTOSCOPY, FLEXIBLE (N/A)  Requested Anesthesia Type:General/MAC  Surgeon: Alfredito Holland MD  Service: Urology  Known or anticipated Date of Surgery:10/18/2018    Surgeon notes: reviewed    Electronic QUestionnaire Assessment completed via nurse interview with patient.        No AQ      Triage considerations:     The patient has no apparent active cardiac condition (No unstable coronary Syndrome such as severe unstable angina or recent [<1 month] myocardial infarction, decompensated CHF, severe valvular   disease or significant arrhythmia)    Previous anesthesia records:GETA, MAC and No problems   10/2/18 cysto:   Airway/Jaw/Neck:  Airway Findings: Mouth Opening: Normal Tongue: Normal  General Airway Assessment: Good  Mallampati: I  TM Distance: Normal, at least 6 cm  Jaw/Neck Findings:  Neck ROM: Normal ROM      Dental:  Dental Findings: In tact       Last PCP note: outside [facility]   Subspecialty notes: Endocrinology    Other important co-morbidities: none     Tests already available:  Available tests,  within 3 months . 9/8/18 CMP, CBC, EKG.            Instructions given. (See in Nurse's note)    Optimization:  Anesthesia Preop Clinic Assessment  Indicated-not required for this procedure     Plan:    Testing:  none      Patient  has previously scheduled Medical Appointment: none    Navigation:               Straight Line to surgery.               No tests, anesthesia preop clinic visit, or consult required.

## 2018-10-11 NOTE — ANESTHESIA PREPROCEDURE EVALUATION
Carlie Mar RN   Registered Nurse      Pre Admission Screening   Signed                             []Hide copied text    []Hover for details      Anesthesia Assessment: Preoperative EQUATION     Planned Procedure: Procedure(s) (LRB):  CYSTOSCOPY, FLEXIBLE (N/A)  Requested Anesthesia Type:General/MAC  Surgeon: Alfredito Holland MD  Service: Urology  Known or anticipated Date of Surgery:10/18/2018     Surgeon notes: reviewed     Electronic QUestionnaire Assessment completed via nurse interview with patient.         No AQ        Triage considerations:      The patient has no apparent active cardiac condition (No unstable coronary Syndrome such as severe unstable angina or recent [<1 month] myocardial infarction, decompensated CHF, severe valvular   disease or significant arrhythmia)     Previous anesthesia records:GETA, MAC and No problems   10/2/18 cysto:   Airway/Jaw/Neck:  Airway Findings: Mouth Opening: Normal Tongue: Normal  General Airway Assessment: Good  Mallampati: I  TM Distance: Normal, at least 6 cm  Jaw/Neck Findings:  Neck ROM: Normal ROM      Dental:  Dental Findings: In tact         Last PCP note: outside [facility]   Subspecialty notes: Endocrinology     Other important co-morbidities: none     Tests already available:  Available tests,  within 3 months . 9/8/18 CMP, CBC, EKG.                            Instructions given. (See in Nurse's note)     Optimization:  Anesthesia Preop Clinic Assessment  Indicated-not required for this procedure      Plan:               Testing:  none                           Patient  has previously scheduled Medical Appointment: none     Navigation:                         Straight Line to surgery.                          No tests, anesthesia preop clinic visit, or consult required.                                                            Electronically signed by Carlie Mar RN at 10/11/2018 10:10 AM       Pre-admit on 10/18/2018            Detailed Report                                                                                                                     10/11/2018  Xu Bonilla is a 23 y.o., male.    Anesthesia Evaluation    I have reviewed the Patient Summary Reports.    I have reviewed the Nursing Notes.   I have reviewed the Medications.     Review of Systems  Anesthesia Hx:  No problems with previous Anesthesia  History of prior surgery of interest to airway management or planning: Previous anesthesia: MAC  10/11 cysto with MAC.  Procedure performed at an [facility] Facility. Denies Family Hx of Anesthesia complications.   Denies Personal Hx of Anesthesia complications.   Social:  Former Smoker, Social Alcohol Use    Hematology/Oncology:  Hematology Normal   Oncology Normal     EENT/Dental:EENT/Dental Normal   Cardiovascular:  Cardiovascular Normal Exercise tolerance: good   Denies Angina.    Pulmonary:  Pulmonary Normal  Denies Shortness of breath.  Denies Recent URI.    Renal/:  Renal/ Normal  Transgender-S/P sex reassignment surgery Renal Symptoms/Infections/Stones:  Urinary Tract Infection (UTI) (on augmentin 10/11 for UTI) Other Renal / Gu Conditions: (urinary fistula S/P sex reassignment surgery)   Hepatic/GI:  Hepatic/GI Normal    Musculoskeletal:  Musculoskeletal Normal    Neurological:  Neurology Normal    Endocrine:  Endocrine Normal    Dermatological:  Skin Normal    Psych:  Psychiatric Normal  Denies Psychiatric History.  Attention Deficit Disorder.         Physical Exam  General:  Well nourished    Airway/Jaw/Neck:  Airway Findings: Mouth Opening: Normal Tongue: Normal  General Airway Assessment: Good  Mallampati: I  TM Distance: Normal, at least 6 cm  Jaw/Neck Findings:  Neck ROM: Normal ROM      Dental:  Dental Findings: In tact   Chest/Lungs:  Chest/Lungs Findings: Normal Respiratory Rate     Heart/Vascular:  Heart Findings:       Mental Status:  Mental Status Findings:  Cooperative, Alert and Oriented         Anesthesia  Plan  Type of Anesthesia, risks & benefits discussed:  Anesthesia Type:  general, MAC  Patient's Preference:   Intra-op Monitoring Plan: standard ASA monitors  Intra-op Monitoring Plan Comments:   Post Op Pain Control Plan: multimodal analgesia  Post Op Pain Control Plan Comments:   Induction:   IV  Beta Blocker:  Patient is not currently on a Beta-Blocker (No further documentation required).       Informed Consent: Patient understands risks and agrees with Anesthesia plan.  Questions answered. Anesthesia consent signed with patient.  ASA Score: 2     Day of Surgery Review of History & Physical:    H&P update referred to the surgeon.         Ready For Surgery From Anesthesia Perspective.

## 2018-10-17 ENCOUNTER — TELEPHONE (OUTPATIENT)
Dept: UROLOGY | Facility: CLINIC | Age: 23
End: 2018-10-17

## 2018-10-17 NOTE — TELEPHONE ENCOUNTER
Called pt to confirm arrival time of 12pm for procedure on 10/18/2018. Gave pt NPO instructions and gave pt opportunity to ask questions. Pt verbalized understanding.

## 2018-10-18 ENCOUNTER — HOSPITAL ENCOUNTER (OUTPATIENT)
Facility: HOSPITAL | Age: 23
Discharge: HOME | End: 2018-10-18
Attending: UROLOGY | Admitting: UROLOGY
Payer: COMMERCIAL

## 2018-10-18 ENCOUNTER — ANESTHESIA (OUTPATIENT)
Dept: SURGERY | Facility: HOSPITAL | Age: 23
End: 2018-10-18
Payer: COMMERCIAL

## 2018-10-18 VITALS
TEMPERATURE: 98 F | HEART RATE: 60 BPM | OXYGEN SATURATION: 100 % | WEIGHT: 170 LBS | RESPIRATION RATE: 18 BRPM | SYSTOLIC BLOOD PRESSURE: 104 MMHG | HEIGHT: 68 IN | BODY MASS INDEX: 25.76 KG/M2 | DIASTOLIC BLOOD PRESSURE: 54 MMHG

## 2018-10-18 DIAGNOSIS — N36.0 URINARY FISTULA: ICD-10-CM

## 2018-10-18 DIAGNOSIS — N36.0 URETHRAL FISTULA: ICD-10-CM

## 2018-10-18 DIAGNOSIS — Z87.890 TRANSGENDER, S/P SEX REASSIGNMENT SURGERY: Primary | ICD-10-CM

## 2018-10-18 PROCEDURE — 76000 FLUOROSCOPY <1 HR PHYS/QHP: CPT | Mod: 26,59,, | Performed by: UROLOGY

## 2018-10-18 PROCEDURE — C1769 GUIDE WIRE: HCPCS | Performed by: UROLOGY

## 2018-10-18 PROCEDURE — 51710 CHANGE OF BLADDER TUBE: CPT | Mod: ,,, | Performed by: UROLOGY

## 2018-10-18 PROCEDURE — 36000706: Performed by: UROLOGY

## 2018-10-18 PROCEDURE — 63600175 PHARM REV CODE 636 W HCPCS: Performed by: NURSE ANESTHETIST, CERTIFIED REGISTERED

## 2018-10-18 PROCEDURE — 37000008 HC ANESTHESIA 1ST 15 MINUTES: Performed by: UROLOGY

## 2018-10-18 PROCEDURE — 25000003 PHARM REV CODE 250: Performed by: REGISTERED NURSE

## 2018-10-18 PROCEDURE — 63600175 PHARM REV CODE 636 W HCPCS: Performed by: STUDENT IN AN ORGANIZED HEALTH CARE EDUCATION/TRAINING PROGRAM

## 2018-10-18 PROCEDURE — 25000003 PHARM REV CODE 250: Performed by: STUDENT IN AN ORGANIZED HEALTH CARE EDUCATION/TRAINING PROGRAM

## 2018-10-18 PROCEDURE — D9220A PRA ANESTHESIA: Mod: ANES,,, | Performed by: ANESTHESIOLOGY

## 2018-10-18 PROCEDURE — D9220A PRA ANESTHESIA: Mod: CRNA,,, | Performed by: REGISTERED NURSE

## 2018-10-18 PROCEDURE — 71000015 HC POSTOP RECOV 1ST HR: Performed by: UROLOGY

## 2018-10-18 PROCEDURE — 37000009 HC ANESTHESIA EA ADD 15 MINS: Performed by: UROLOGY

## 2018-10-18 PROCEDURE — C1758 CATHETER, URETERAL: HCPCS | Performed by: UROLOGY

## 2018-10-18 PROCEDURE — 36000707: Performed by: UROLOGY

## 2018-10-18 PROCEDURE — 63600175 PHARM REV CODE 636 W HCPCS: Performed by: REGISTERED NURSE

## 2018-10-18 RX ORDER — LIDOCAINE HYDROCHLORIDE 10 MG/ML
1 INJECTION, SOLUTION EPIDURAL; INFILTRATION; INTRACAUDAL; PERINEURAL ONCE
Status: DISCONTINUED | OUTPATIENT
Start: 2018-10-18 | End: 2018-10-18 | Stop reason: HOSPADM

## 2018-10-18 RX ORDER — CEFAZOLIN SODIUM 1 G/3ML
2 INJECTION, POWDER, FOR SOLUTION INTRAMUSCULAR; INTRAVENOUS
Status: COMPLETED | OUTPATIENT
Start: 2018-10-18 | End: 2018-10-18

## 2018-10-18 RX ORDER — PROPOFOL 10 MG/ML
VIAL (ML) INTRAVENOUS
Status: DISCONTINUED | OUTPATIENT
Start: 2018-10-18 | End: 2018-10-18

## 2018-10-18 RX ORDER — MIDAZOLAM HYDROCHLORIDE 1 MG/ML
INJECTION, SOLUTION INTRAMUSCULAR; INTRAVENOUS
Status: DISCONTINUED | OUTPATIENT
Start: 2018-10-18 | End: 2018-10-18

## 2018-10-18 RX ORDER — HYOSCYAMINE SULFATE 0.125 MG
125 TABLET ORAL EVERY 4 HOURS PRN
Qty: 30 TABLET | Refills: 3 | Status: SHIPPED | OUTPATIENT
Start: 2018-10-18 | End: 2018-11-17

## 2018-10-18 RX ORDER — SODIUM CHLORIDE 0.9 % (FLUSH) 0.9 %
3 SYRINGE (ML) INJECTION
Status: DISCONTINUED | OUTPATIENT
Start: 2018-10-18 | End: 2018-10-18 | Stop reason: HOSPADM

## 2018-10-18 RX ORDER — HYDROCODONE BITARTRATE AND ACETAMINOPHEN 5; 325 MG/1; MG/1
1 TABLET ORAL EVERY 6 HOURS PRN
Qty: 8 TABLET | Refills: 0 | Status: SHIPPED | OUTPATIENT
Start: 2018-10-18 | End: 2018-10-28

## 2018-10-18 RX ORDER — FENTANYL CITRATE 50 UG/ML
INJECTION, SOLUTION INTRAMUSCULAR; INTRAVENOUS
Status: DISCONTINUED | OUTPATIENT
Start: 2018-10-18 | End: 2018-10-18

## 2018-10-18 RX ORDER — SODIUM CHLORIDE 9 MG/ML
INJECTION, SOLUTION INTRAVENOUS CONTINUOUS
Status: DISCONTINUED | OUTPATIENT
Start: 2018-10-18 | End: 2018-10-18 | Stop reason: HOSPADM

## 2018-10-18 RX ORDER — PROPOFOL 10 MG/ML
VIAL (ML) INTRAVENOUS CONTINUOUS PRN
Status: DISCONTINUED | OUTPATIENT
Start: 2018-10-18 | End: 2018-10-18

## 2018-10-18 RX ORDER — PHENYLEPHRINE HYDROCHLORIDE 10 MG/ML
INJECTION INTRAVENOUS
Status: DISCONTINUED | OUTPATIENT
Start: 2018-10-18 | End: 2018-10-18

## 2018-10-18 RX ORDER — LIDOCAINE HCL/PF 100 MG/5ML
SYRINGE (ML) INTRAVENOUS
Status: DISCONTINUED | OUTPATIENT
Start: 2018-10-18 | End: 2018-10-18

## 2018-10-18 RX ORDER — HYDROCODONE BITARTRATE AND ACETAMINOPHEN 5; 325 MG/1; MG/1
1 TABLET ORAL EVERY 6 HOURS PRN
Status: DISCONTINUED | OUTPATIENT
Start: 2018-10-18 | End: 2018-10-18 | Stop reason: HOSPADM

## 2018-10-18 RX ADMIN — LIDOCAINE HYDROCHLORIDE 60 MG: 20 INJECTION, SOLUTION INTRAVENOUS at 02:10

## 2018-10-18 RX ADMIN — PHENYLEPHRINE HYDROCHLORIDE 100 MCG: 10 INJECTION INTRAVENOUS at 02:10

## 2018-10-18 RX ADMIN — FENTANYL CITRATE 25 MCG: 50 INJECTION, SOLUTION INTRAMUSCULAR; INTRAVENOUS at 02:10

## 2018-10-18 RX ADMIN — CEFAZOLIN 2 G: 330 INJECTION, POWDER, FOR SOLUTION INTRAMUSCULAR; INTRAVENOUS at 02:10

## 2018-10-18 RX ADMIN — PROPOFOL 30 MG: 10 INJECTION, EMULSION INTRAVENOUS at 02:10

## 2018-10-18 RX ADMIN — SODIUM CHLORIDE: 0.9 INJECTION, SOLUTION INTRAVENOUS at 02:10

## 2018-10-18 RX ADMIN — SODIUM CHLORIDE, SODIUM GLUCONATE, SODIUM ACETATE, POTASSIUM CHLORIDE, MAGNESIUM CHLORIDE, SODIUM PHOSPHATE, DIBASIC, AND POTASSIUM PHOSPHATE: .53; .5; .37; .037; .03; .012; .00082 INJECTION, SOLUTION INTRAVENOUS at 02:10

## 2018-10-18 RX ADMIN — MIDAZOLAM HYDROCHLORIDE 2 MG: 1 INJECTION, SOLUTION INTRAMUSCULAR; INTRAVENOUS at 02:10

## 2018-10-18 RX ADMIN — PROPOFOL 70 MG: 10 INJECTION, EMULSION INTRAVENOUS at 02:10

## 2018-10-18 RX ADMIN — PHENYLEPHRINE HYDROCHLORIDE 150 MCG: 10 INJECTION INTRAVENOUS at 02:10

## 2018-10-18 RX ADMIN — PROPOFOL 150 MCG/KG/MIN: 10 INJECTION, EMULSION INTRAVENOUS at 02:10

## 2018-10-18 RX ADMIN — FENTANYL CITRATE 50 MCG: 50 INJECTION, SOLUTION INTRAMUSCULAR; INTRAVENOUS at 02:10

## 2018-10-18 NOTE — INTERVAL H&P NOTE
The patient has been examined and the H&P has been reviewed:    I concur with the findings and no changes have occurred since H&P was written.     Urine dipstick today + for blood, negative for all other components    His urethral catheter balloon popped and was removed, plan to replace jovani urethral catheter today.       Anesthesia/Surgery risks, benefits and alternative options discussed and understood by patient/family.          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

## 2018-10-18 NOTE — PLAN OF CARE
"Patient and friend state they are ready to be discharged. Instructions given; verbalized understanding. Patient tolerating po liquids with no difficulty. Patient denies pain. Anesthesia consent and surgical consent in chart upon patient's discharge from [facility].    Desai bag removed and capped off per patient.     Patient refused narcotic prescription. Patient states "I have a whole bottle of Percocet at home." Paper prescription placed in shred bin.   "

## 2018-10-18 NOTE — DISCHARGE INSTRUCTIONS
Cystoscopy    Cystoscopy is a procedure that lets your doctor look directly inside your urethra and bladder. It can be used to:  · Help diagnose a problem with your urethra, bladder, or kidneys.  · Take a sample (biopsy) of bladder or urethral tissue.  · Treat certain problems (such as removing kidney stones).  · Place a stent to bypass an obstruction.  · Take special X-rays of the kidneys.  Based on the findings, your doctor may recommend other tests or treatments.  What is a cystoscope?  A cystoscope is a telescope-like instrument that contains lenses and fiberoptics (small glass wires that make bright light). The cystoscope may be straight and rigid, or flexible to bend around curves in the urethra. The doctor may look directly into the cystoscope, or project the image onto a monitor.  Getting ready  · Ask your doctor if you should stop taking any medicines before the procedure.  · Ask whether you should avoid eating or drinking anything after midnight before the procedure.  · Follow any other instructions your doctor gives you.  Tell your doctor before the exam if you:  · Take any medicines, such as aspirin or blood thinners  · Have allergies to any medicines  · Are pregnant   The procedure  Cystoscopy is done in the doctors office, surgery center, or hospital. The doctor and a nurse are present during the procedure. It takes only a few minutes, longer if a biopsy, X-ray, or treatment needs to be done.  During the procedure:  · You lie on an exam table on your back, knees bent and legs apart. You are covered with a drape.  · Your urethra and the area around it are washed. Anesthetic jelly may be applied to numb the urethra. Other pain medicine is usually not needed. In some cases, you may be offered a mild sedative to help you relax. If a more extensive procedure is to be done, such as a biopsy or kidney stone removal, general anesthesia may be needed.  · The cystoscope is inserted. A sterile fluid is put  into the bladder to expand it. You may feel pressure from this fluid.  · When the procedure is done, the cystoscope is removed.  After the procedure  If you had a sedative, general anesthesia, or spinal anesthesia, you must have someone drive you home. Once youre home:  · Drink plenty of fluids.  · You may have burning or light bleeding when you urinate--this is normal.  · Medicines may be prescribed to ease any discomfort or prevent infection. Take these as directed.  · Call your doctor if you have heavy bleeding or blood clots, burning that lasts more than a day, a fever over 100°F  (38° C), or trouble urinating.  Date Last Reviewed: 1/1/2017 © 2000-2017 [employer]. [address] [place] [address], [place] [address]. All rights reserved. This information is not intended as a substitute for professional medical care. Always follow your healthcare professional's instructions.      Recovery After Procedural Sedation (Adult)  You have been given medicine by vein to make you sleep during your surgery. This may have included both a pain medicine and sleeping medicine. Most of the effects have worn off. But you may still have some drowsiness for the next 6 to 8 hours.  Home care  Follow these guidelines when you get home:  · For the next 8 hours, you should be watched by a responsible adult. This person should make sure your condition is not getting worse.  · Don't drink any alcohol for the next 24 hours.  · Don't drive, operate dangerous machinery, or make important business or personal decisions during the next 24 hours.  Note: Your healthcare provider may tell you not to take any medicine by mouth for pain or sleep in the next 4 hours. These medicines may react with the medicines you were given in the hospital. This could cause a much stronger response than usual.  Follow-up care  Follow up with your healthcare provider if you are not alert and back to your usual level of activity within 12 hours.  When to  seek medical advice  Call your healthcare provider right away if any of these occur:  · Drowsiness gets worse  · Weakness or dizziness gets worse  · Repeated vomiting  · You can't be awakened   Date Last Reviewed: 10/18/2016  © 5535-0398 [employer]. [address] [place] [address], [place] [address]. All rights reserved. This information is not intended as a substitute for professional medical care. Always follow your healthcare professional's instructions.

## 2018-10-18 NOTE — ANESTHESIA POSTPROCEDURE EVALUATION
"Anesthesia Post Evaluation    Patient: Xu Bonilla    Procedure(s) Performed: Procedure(s) (LRB):  CYSTOSCOPY, FLEXIBLE (N/A)  INSERTION, CATHETER-TATE  (N/A)  INSERTION, SUPRAPUBIC CATHETER exchange (N/A)    Final Anesthesia Type: general  Patient location during evaluation: PACU  Patient participation: Yes- Able to Participate  Level of consciousness: awake and alert, awake and oriented  Post-procedure vital signs: reviewed and stable  Pain management: adequate  Airway patency: patent  PONV status at discharge: No PONV  Anesthetic complications: no      Cardiovascular status: blood pressure returned to baseline, stable and hemodynamically stable  Respiratory status: unassisted, spontaneous ventilation and room air  Hydration status: euvolemic  Follow-up not needed.        Visit Vitals  /60 (BP Location: Left arm, Patient Position: Lying)   Pulse 65   Temp 36.7 °C (98.1 °F) (Oral)   Resp 16   Ht 5' 8" (1.727 m)   Wt 77.1 kg (170 lb)   SpO2 97%   BMI 25.85 kg/m²       Pain/Maxine Score: Pain Assessment Performed: Yes (10/18/2018 12:31 PM)  Presence of Pain: denies (10/18/2018 12:31 PM)        "

## 2018-10-18 NOTE — ADDENDUM NOTE
Addendum  created 10/18/18 1611 by Enzo Gallagher MD    Order list changed, Order sets accessed

## 2018-10-18 NOTE — DISCHARGE SUMMARY
[facility]  Discharge Note  Short Stay    Admit Date: 10/18/2018    Discharge Date and Time: 10/18/2018       Attending Physician: Alfredito Holland MD     Discharge Provider: William Ortiz MD    Diagnoses:  Active Hospital Problems    Diagnosis  POA    *Urinary fistula [N36.0]  Yes    Transgender, S/P sex reassignment surgery [Z87.890]  Yes      Resolved Hospital Problems   No resolved problems to display.       Discharged Condition: good    Hospital Course: Patient was admitted for a cystourethroscopy with insertion of fonseca cathter and replacement of SP tube and tolerated the procedure well with no complications.    Final Diagnoses: Same as principal problem.    Disposition: Home or Self Care    Follow up/Patient Instructions:    Medications:  Reconciled Home Medications:      Medication List      START taking these medications    HYDROcodone-acetaminophen 5-325 mg per tablet  Commonly known as:  NORCO  Take 1 tablet by mouth every 6 (six) hours as needed for Pain.     hyoscyamine 0.125 mg Tab  Commonly known as:  ANASPAZ,LEVSIN  Take 1 tablet (125 mcg total) by mouth every 4 (four) hours as needed.        CONTINUE taking these medications    amoxicillin-clavulanate 875-125mg 875-125 mg per tablet  Commonly known as:  AUGMENTIN  Take 1 tablet by mouth every 12 (twelve) hours. for 10 days     dextroamphetamine-amphetamine 20 mg tablet  Commonly known as:  ADDERALL  Take 20 mg by mouth.     multivitamin capsule  Take 1 capsule by mouth once daily.     oxybutynin 5 MG Tab  Commonly known as:  DITROPAN  3 (three) times daily.     oxyCODONE-acetaminophen 5-325 mg per tablet  Commonly known as:  PERCOCET  Take 1 tablet by mouth every 4 (four) hours as needed.     testosterone cypionate 100 mg/mL injection  Commonly known as:  DEPOTESTOTERONE CYPIONATE  Inject 150 mg into the muscle every 14 (fourteen) days.          Discharge Procedure Orders   Call MD for:  temperature >100.4     Call MD for:   persistent nausea and vomiting or diarrhea     Call MD for:  severe uncontrolled pain     Call MD for:  redness, tenderness, or signs of infection (pain, swelling, redness, odor or green/yellow discharge around incision site)     Call MD for:  difficulty breathing or increased cough     Call MD for:  severe persistent headache     Call MD for:  worsening rash     Call MD for:  persistent dizziness, light-headedness, or visual disturbances     Call MD for:  increased confusion or weakness     No dressing needed     Activity as tolerated     Follow-up Information     Alfredito Holland MD. Call in 1 week.    Specialty:  Urology  Why:  post op cystoscopy with urethral catheter insertion   Contact information:  [address]  [facility] [address]  [phone]                   Discharge Procedure Orders (must include Diet, Follow-up, Activity):   Discharge Procedure Orders (must include Diet, Follow-up, Activity)   Call MD for:  temperature >100.4     Call MD for:  persistent nausea and vomiting or diarrhea     Call MD for:  severe uncontrolled pain     Call MD for:  redness, tenderness, or signs of infection (pain, swelling, redness, odor or green/yellow discharge around incision site)     Call MD for:  difficulty breathing or increased cough     Call MD for:  severe persistent headache     Call MD for:  worsening rash     Call MD for:  persistent dizziness, light-headedness, or visual disturbances     Call MD for:  increased confusion or weakness     No dressing needed     Activity as tolerated

## 2018-10-18 NOTE — OP NOTE
[facility]  Operative Note    Date: 10/18/2018    Pre-Op Diagnosis:   1. Transgender male  2.  s/p creation of phallus from thigh flap  3.  Marc-urethral fistulae   4.  Dehiscence of penoscrotal flap incision    Patient Active Problem List    Diagnosis Date Noted    Urinary fistula 10/02/2018    Transgender, S/P sex reassignment surgery 09/08/2018         Post-Op Diagnosis: same    Procedure(s) Performed:   1.  Cystourethroscopy   2.  Fonseca catheter insertion, complicated   3.  Replacement of suprapubic tube   4.  Fluoroscopy < 1 hour  5.  Intraoperative interpretation of radiographic images    Specimen(s): none    Staff Surgeon: Alfredito Holland MD    Assistant Surgeon: William Ortiz MD    Anesthesia:  General LMA anesthesia    Indications: Xu Bonilla is a 23 y.o. transgender male s/p creation of phallus from thigh flap with multiple fistulae along the course of the marc-urethral, the largest of which originates from the dehiscence of the penoscrotal incision where the flap meets the native perineal tissue    Findings:    - hair throughout neourethra  - multiple fistulae along course of neourethra  - false passage inferior to neobulbar urethra   - SP tract well established and epithelialized   - rigid ureteroscopy used to perform neourethroscopy  - we were able to advance the ureteroscope into the native urethra through the penoscrotal fistula and feed a wire into the bladder retrograde, then feed the other end of the wire in an antegrade fashion through the pendulous neourethra   - 14 fr silicone converted to Councill tip fonseca catheter inserted over wire  - SP tube replaced     Estimated Blood Loss: minimal    Drains:   1.  14 fr silicone urethral fonseca catheter  2.  16 fr suprapubic tube     Procedure in detail:  After informed consent was obtained and all questions were answered, the patient was brought to the operating suite and placed int he supine position.  MAC anesthesia was  administered.  TEDs and SCDs were applied and working prior to induction of anesthesia.  The patient was then prepped and draped in the usual sterile fashion.  Time out was performed and preoperative antibiotics were confirmed.      We began by performing flexible urethroscopy with a 16 fr flexible cystoscope.  This was challenging, as the neourethra was narrowed and full of hair.  The flexible cystoscope entered the neomeatus, but exited the large urethrocutaneous fistula at the penoscrotal junction.  We were unable to advance the scope into the bladder, and the cystoscope was removed.     We then removed the SP tube and performed flexible cystoscopy.  We identified the UOs and bladder neck.  We attempted to pass a glide wire through the bladder neck into the urethra, but this was unsuccessful.      We the inserted a semirigid ureteroscope per neomeatus into neourethra.  Similar to previous, the scope exited the penoscrotal fistula and would not enter the proximal urethral lumen.   The scope was removed, and then inserted per the external aspect of the penoscrotal fistula.  The urethral lumen was identified and cannulated with the scope.  A glide wire was inserted thorugh the scope into the bladder, and the scope advanced over this into the bladder.  The wire was left in place, and the scope removed. The distal aspect of the wire was inserted through the penoscrotal fistula into the proximal aspect of the pendulous urethra and advanced until the wire protruded from the meatus for a significant distance.  The wire was straightened until no more wire protruded from the penoscrotal urethral fistula.  Fluoro was used to confirm that wire was in the bladder.  The wire was then exchanged for an Amplatz wire with the help of a 5 fr open-ended catheter.      A 14 fr silicone converted to Councill tip fonseca catheter was advanced over the wire into the bladder until urine/ irrigant returned and the catheter was hubbed.   Location was further confirmed with flexible antegrade cystoscopy.  The balloon was inflated with 10 cc sterile water and the wire removed.  Cystoscopy confirmed that the fonseca catheter remained in good position.  We then inserted a 16 fr fonseca catheter per the SP tube tract until irrigant returned.  The balloon was inflated with 20 cc sterile water.      The patient tolerated the procedure well and was transferred to the PACU in stable condition.      Disposition:  The patient will be discharged home.  He will follow up with Dr. Holland.     William Ortiz MD    As the attending surgeon, Dr. Holland was present for and performed all key aspects of the operation.

## 2018-10-18 NOTE — TRANSFER OF CARE
"Anesthesia Transfer of Care Note    Patient: Xu Bonilla    Procedure(s) Performed: Procedure(s) (LRB):  CYSTOSCOPY, FLEXIBLE (N/A)  INSERTION, CATHETER-TATE  (N/A)  INSERTION, SUPRAPUBIC CATHETER exchange (N/A)    Patient location: PACU    Anesthesia Type: general    Transport from OR: Transported from OR on 6-10 L/min O2 by face mask with adequate spontaneous ventilation    Post pain: adequate analgesia    Post assessment: no apparent anesthetic complications and tolerated procedure well    Post vital signs: stable    Level of consciousness: sedated    Nausea/Vomiting: no nausea/vomiting    Complications: none    Transfer of care protocol was followed      Last vitals:   Visit Vitals  /60 (BP Location: Left arm, Patient Position: Lying)   Pulse 65   Temp 36.7 °C (98.1 °F) (Oral)   Resp 16   Ht 5' 8" (1.727 m)   Wt 77.1 kg (170 lb)   SpO2 97%   BMI 25.85 kg/m²     "

## 2019-02-25 ENCOUNTER — OFFICE VISIT (OUTPATIENT)
Dept: UROLOGY | Facility: CLINIC | Age: 24
End: 2019-02-25
Payer: COMMERCIAL

## 2019-02-25 VITALS
SYSTOLIC BLOOD PRESSURE: 110 MMHG | HEART RATE: 75 BPM | DIASTOLIC BLOOD PRESSURE: 66 MMHG | WEIGHT: 160.94 LBS | HEIGHT: 68 IN | BODY MASS INDEX: 24.39 KG/M2

## 2019-02-25 DIAGNOSIS — L98.8 FISTULA: ICD-10-CM

## 2019-02-25 DIAGNOSIS — Z43.5 ENCOUNTER FOR CARE OR REPLACEMENT OF SUPRAPUBIC TUBE: Primary | ICD-10-CM

## 2019-02-25 DIAGNOSIS — Z93.59 CHRONIC SUPRAPUBIC CATHETER: ICD-10-CM

## 2019-02-25 PROCEDURE — 99999 PR PBB SHADOW E&M-EST. PATIENT-LVL III: CPT | Mod: PBBFAC,,, | Performed by: NURSE PRACTITIONER

## 2019-02-25 PROCEDURE — 51705 PR CHANGE OF BLADDER TUBE,SIMPLE: ICD-10-PCS | Mod: S$GLB,,, | Performed by: NURSE PRACTITIONER

## 2019-02-25 PROCEDURE — 99214 OFFICE O/P EST MOD 30 MIN: CPT | Mod: 25,S$GLB,, | Performed by: NURSE PRACTITIONER

## 2019-02-25 PROCEDURE — 51705 CHANGE OF BLADDER TUBE: CPT | Mod: S$GLB,,, | Performed by: NURSE PRACTITIONER

## 2019-02-25 PROCEDURE — 3008F PR BODY MASS INDEX (BMI) DOCUMENTED: ICD-10-PCS | Mod: CPTII,S$GLB,, | Performed by: NURSE PRACTITIONER

## 2019-02-25 PROCEDURE — 3008F BODY MASS INDEX DOCD: CPT | Mod: CPTII,S$GLB,, | Performed by: NURSE PRACTITIONER

## 2019-02-25 PROCEDURE — 99999 PR PBB SHADOW E&M-EST. PATIENT-LVL III: ICD-10-PCS | Mod: PBBFAC,,, | Performed by: NURSE PRACTITIONER

## 2019-02-25 PROCEDURE — 99214 PR OFFICE/OUTPT VISIT, EST, LEVL IV, 30-39 MIN: ICD-10-PCS | Mod: 25,S$GLB,, | Performed by: NURSE PRACTITIONER

## 2019-02-25 RX ORDER — PHENAZOPYRIDINE HYDROCHLORIDE 100 MG/1
TABLET, FILM COATED ORAL
Refills: 2 | COMMUNITY
Start: 2019-02-07

## 2019-02-25 RX ORDER — HYOSCYAMINE SULFATE 0.12 MG/1
TABLET SUBLINGUAL
Refills: 0 | COMMUNITY
Start: 2019-01-31

## 2019-02-25 NOTE — PROGRESS NOTES
Subjective:       Patient ID: Xu Bonilla is a 24 y.o. male.    Chief Complaint: Fistula (urethral fistula; chronic SPT)    HPI   Xu Bonilla is a 24 y.o. male is a new patient to me (records reviewed personally by me). He presents today for SPT exchange. He was evaluated by Dr. oHlland in 9/2018 for management of a urethral fistula s/p sex reassignment surgery in August 2018.     The patient recently underwent gender reassignment surgery from a female to a male in [place]. He recently returned home and has been having draining from his perineum where likely his urethra and jovani-urethra were anastomosed. Per the patient, he failed multiple voiding trials. He still has a suprapubic tube in place which appears to be managing his drainage adequately.    Dr. Holland repaired fistula in 10/2018.     Pt reports last SPT exchange was about 8 wks ago (12/2018).    Review of Systems   Constitutional: Negative for chills and fever.   Respiratory: Negative for shortness of breath.    Cardiovascular: Negative for chest pain, palpitations and leg swelling.   Gastrointestinal: Negative for abdominal pain, constipation, nausea and vomiting.   Genitourinary:        SPT   Neurological: Negative for dizziness and headaches.       Objective:      Physical Exam   Vitals reviewed.  Constitutional: He is oriented to person, place, and time. He appears well-developed and well-nourished. No distress.   Eyes: Conjunctivae are normal. No scleral icterus.   Neck: Normal range of motion.   Pulmonary/Chest: Effort normal. No respiratory distress.   Abdominal: Soft. Normal appearance. He exhibits no distension. There is no tenderness.       SPT in place. Stroma w/ minimal granulation, no active bleeding, no erythema, no swelling.   Musculoskeletal: He exhibits no edema.   Neurological: He is alert and oriented to person, place, and time.   Skin: Skin is warm.     Psychiatric: He has a normal mood and affect. His behavior is normal.          Assessment:       1. Encounter for care or replacement of suprapubic tube    2. Fistula    3. Chronic suprapubic catheter        Plan:   I spent 25 minutes with the patient of which more than half was spent in direct consultation with the patient in regards to our treatment and plan.    Education and recommendations of today's plan of care including home remedies.    Discussed and reviewed procedure and plan.  I removed old SPT with resistance, after removal, there was significant crystalization surrounding distal tube, however SPT was successfully removed and properly disposed.   Stoma had some moderate bleeding after removal, however hemostasis was successfully achieved w/ moderate pressure and gauze. Stroma was then cleaned with betadine.  I easily placed a 16 fr SPT using sterile technique.    Clear yellow urine received and balloon inflated by with ~10cc sterile water.   Tolerated well.  Site cleaned.   Daily skin care and suprapubic catheter care discussed.  Educational materials given.  Increase water intake to help with sediment.   RTC 4 weeks for next SPT change.  Emphasized importance of not delaying changes to prevent excess crystallizations.   Patient expressed and verbalized understanding.

## 2019-04-02 ENCOUNTER — OFFICE VISIT (OUTPATIENT)
Dept: UROLOGY | Facility: CLINIC | Age: 24
End: 2019-04-02
Payer: COMMERCIAL

## 2019-04-02 VITALS
SYSTOLIC BLOOD PRESSURE: 100 MMHG | WEIGHT: 164.44 LBS | HEIGHT: 68 IN | DIASTOLIC BLOOD PRESSURE: 66 MMHG | BODY MASS INDEX: 24.92 KG/M2 | HEART RATE: 73 BPM

## 2019-04-02 DIAGNOSIS — Z43.5 ENCOUNTER FOR SUPRAPUBIC CATHETER CARE: Primary | ICD-10-CM

## 2019-04-02 DIAGNOSIS — L98.8 FISTULA: ICD-10-CM

## 2019-04-02 PROCEDURE — 99213 OFFICE O/P EST LOW 20 MIN: CPT | Mod: 25,S$GLB,, | Performed by: PHYSICIAN ASSISTANT

## 2019-04-02 PROCEDURE — 99213 PR OFFICE/OUTPT VISIT, EST, LEVL III, 20-29 MIN: ICD-10-PCS | Mod: 25,S$GLB,, | Performed by: PHYSICIAN ASSISTANT

## 2019-04-02 PROCEDURE — 51705 PR CHANGE OF BLADDER TUBE,SIMPLE: ICD-10-PCS | Mod: S$GLB,,, | Performed by: PHYSICIAN ASSISTANT

## 2019-04-02 PROCEDURE — 3008F BODY MASS INDEX DOCD: CPT | Mod: CPTII,S$GLB,, | Performed by: PHYSICIAN ASSISTANT

## 2019-04-02 PROCEDURE — 99999 PR PBB SHADOW E&M-EST. PATIENT-LVL III: CPT | Mod: PBBFAC,,, | Performed by: PHYSICIAN ASSISTANT

## 2019-04-02 PROCEDURE — 3008F PR BODY MASS INDEX (BMI) DOCUMENTED: ICD-10-PCS | Mod: CPTII,S$GLB,, | Performed by: PHYSICIAN ASSISTANT

## 2019-04-02 PROCEDURE — 51705 CHANGE OF BLADDER TUBE: CPT | Mod: S$GLB,,, | Performed by: PHYSICIAN ASSISTANT

## 2019-04-02 PROCEDURE — 99999 PR PBB SHADOW E&M-EST. PATIENT-LVL III: ICD-10-PCS | Mod: PBBFAC,,, | Performed by: PHYSICIAN ASSISTANT

## 2019-04-02 NOTE — PROGRESS NOTES
CHIEF COMPLAINT:    Mr. Bonilla is a 24 y.o. male presenting for suprapubic catheter change.    PRESENTING ILLNESS:    Xu Bonilla is a 24 y.o. male with a PMH of gender reassignment and fistula who presents for suprapubic catheter change.      The patient underwent gender reassignment surgery from a female to a male in [place] done by  in August 2018. He recently returned home and has been having draining from his perineum where likely his urethra and jovani-urethra were anastomosed.      He had the following with Dr. Holland on 10/18/18:     Procedure(s) Performed:   1.  Cystourethroscopy   2.  Fonseca catheter insertion, complicated   3.  Replacement of suprapubic tube   4.  Fluoroscopy < 1 hour  5.  Intraoperative interpretation of radiographic images   Specimen(s): none   Staff Surgeon: Alfredito Holland MD   Indications: Xu Bonilla is a 23 y.o. 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, the largest of which originates from the dehiscence of the penoscrotal incision where the flap meets the native perineal tissue  Findings:    - hair throughout neourethra  - multiple fistulae along course of neourethra  - false passage inferior to neobulbar urethra   - SP tract well established and epithelialized   - rigid ureteroscopy used to perform neourethroscopy  - we were able to advance the ureteroscope into the native urethra through the penoscrotal fistula and feed a wire into the bladder retrograde, then feed the other end of the wire in an antegrade fashion through the pendulous neourethra   - 14 fr silicone converted to Councill tip fonseca catheter inserted over wire  - SP tube replaced       Drains:   1.  14 fr silicone urethral fonseca catheter  2.  16 fr suprapubic tube     His last catheter change was 2/26/18.  Crystallizations were noted at the distal end of the catheter.      He has a history of chronic UTIs and is on a  low dose bactrim daily managed by Dr. Delgadillo.  Patient states that he recently contacted Dr. Delgadillo because he had clots in his urine.  He states this is typical when he has an UTI.  He was instructed to temporarily double bactrim.    His catheter has been draining fine.  He does not have any other urinary complaints.   He states that he has an appointment to see a plastic surgeon next week to discuss his fistula.           REVIEW OF SYSTEMS:    Constitutional: Negative for fever and chills.   HENT: Negative for hearing loss.   Eyes: Negative for visual disturbance.   Respiratory: Negative for shortness of breath.   Cardiovascular: Negative for chest pain.   Gastrointestinal: Negative for vomiting, and constipation.   Genitourinary: See HPI  Neurological: Negative for dizziness.   Hematological: Does not bruise/bleed easily.   Psychiatric/Behavioral: Negative for confusion.       PATIENT HISTORY:    Past Medical History:   Diagnosis Date    ADHD        Past Surgical History:   Procedure Laterality Date    bilateral subcutaneous mastectomy  2015    CATHETERIZATION, URETHRA -COMPLICATED  10/2/2018    [facility]    CYSTOGRAM  10/2/2018    [facility] by [facility] at [facility]    CYSTOSCOPY, FLEXIBLE N/A 10/18/2018    [facility] at [facility]    CYSTOSCOPY, FLEXIBLE, ANTEGRADE CYSTOSCOPY N/A 10/2/2018    [facility]formed by [facility] at [facility]    HYSTERECTOMY      INSERTION, CATHETER-TATE  N/A 10/18/2018    [facility] by [facility]    INSERTION, SUPRAPUBIC CATHETER exchange N/A 10/18/2018    Per[facility] by [facility] at [facility]    phalloplasty  08/06/2018    SUPRAPUBIC CATHETER EXCHANGE  10/2/2018    [facility]    TONSILLECTOMY  2017       Family History   Problem Relation Age of Onset    No Known Problems Father      Personality disorder Mother     Bipolar disorder Mother            Allergies:  Gluten    Medications:    Current Outpatient Medications:     dextroamphetamine-amphetamine (ADDERALL) 20 mg tablet, Take 20 mg by mouth., Disp: , Rfl:     hyoscyamine (LEVSIN/SL) 0.125 mg Subl, TK 1 T PO SUBLINGUALLY Q 6 HOURS PRN, Disp: , Rfl: 0    multivitamin capsule, Take 1 capsule by mouth once daily., Disp: , Rfl:     oxybutynin (DITROPAN) 5 MG Tab, 3 (three) times daily. , Disp: , Rfl:     phenazopyridine (PYRIDIUM) 100 MG tablet, TK 1 T PO TID PRF BLADDER DISCOMFORT, Disp: , Rfl: 2    testosterone cypionate (DEPOTESTOTERONE CYPIONATE) 100 mg/mL injection, Inject 150 mg into the muscle every 14 (fourteen) days., Disp: , Rfl:     PHYSICAL EXAMINATION:    Constitutional: He appears well-developed and well-nourished.  He is in no apparent distress.    Eyes: No scleral icterus noted bilaterally. No discharge bilaterally.    Nose: No rhinorrhea    Cardiovascular: Normal rate.  No pitting edema noted in lower extremities bilaterally    Pulmonary/Chest: Effort normal. No respiratory distress.     Abdominal:  He exhibits no distension.  There is no CVA tenderness.     Lymphadenopathy:        Right: No supraclavicular adenopathy present.        Left: No supraclavicular adenopathy present.     Neurological: He is alert and oriented to person, place, and time.     Skin: Skin is warm and dry.     Psych: Cooperative with normal affect.    Physical Exam   Abdominal:             LABS:    No results found for: PSA, PSADIAG, PSATOTAL, PSAFREE, PSAFREEPCT    IMPRESSION:    Encounter Diagnoses   Name Primary?    Encounter for suprapubic catheter care Yes    Fistula          PLAN:    Yellow urine drained from bladder prior to removal of catheter.  10cc of water was removed to deflate the balloon.  16fr catheter was removed.  Iodine was used to prep suprapubic os.  Then lidocaine jelly was instilled into tract.  16fr catheter was placed into  suprapubic os.  30cc was instilled into bladder and aspirated without difficulty.  10cc of sterile water was used to inflate the balloon.  The Catheter was plugged and secured to the right side of his lower abdomen.      Patient tolerated the procedure fine.     Follow up in 1 month for suprapubic catheter change.         Lilian Antoine PA-C

## 2022-09-20 NOTE — CONSULTS
Abdomen , soft, nontender, nondistended , no guarding or rigidity , no masses palpable , normal bowel sounds , Liver and Spleen,  no hepatosplenomegaly , liver nontender, PEG tube site shows granulation tissue around bumper [facility]  Urology  Consult Note    Patient Name: Xu Bonilla  MRN: [id number]  Admission Date: 9/8/2018  Hospital Length of Stay: 0   Code Status: No Order   Attending Provider: Alfredito Holland MD  Consulting Provider: Denton Walton MD  Primary Care Physician: No primary care provider on file.  Principal Problem:<principal problem not specified>    Inpatient consult to Urology  Consult performed by: Denton Walton MD  Consult ordered by: Pranav Sabillon MD          Subjective:     HPI:  24yo M with history of interstitial cystitis who underwent gender reassignment surgery (female to male) on August 6th by Dr. Delgadillo in [place]. 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 He comes to the ED today with complaints of perineal pain and poorly healing suture lines and skin irritation. He is from just outside of [place], [place] and was planning on finding a urologist to help with his fistula when he came left [place] on September 4th. He went to an outside hospital in [place] for evaluation and was transferred to [facility]. He has a suprapubic catheter that was placed at the time of his surgery. He keeps this clamped until he needs to void. When he voids he has urinary leakage through the fistula, very little if any urine makes it to the neomeatus. He unclamps his SPT for additional drainage.     On arrival to the ED he is afebrile with stable vital signs. He does not have a leukocytosis. His lab values are all normal. UA from SPT is clear (he is on Bactrim).     No past medical history on file.    No past surgical history on file.    Review of patient's allergies indicates:  No Known Allergies    Family History     None          Tobacco Use    Smoking status: Not on file   Substance and Sexual Activity    Alcohol use: Not on file    Drug use: Not on file    Sexual activity: Not on file       Review of  Systems   Constitutional: Negative for activity change, appetite change, chills, fever and unexpected weight change.   HENT: Negative.    Eyes: Negative.    Respiratory: Negative for chest tightness and shortness of breath.    Cardiovascular: Negative for chest pain.   Gastrointestinal: Negative for abdominal distention, abdominal pain, nausea and vomiting.   Genitourinary:        Pelvic pain  Urinary leakage from fistula   Musculoskeletal: Negative.    Skin: Negative.    Neurological: Negative.    Psychiatric/Behavioral: Negative.        Objective:     Temp:  [98.2 °F (36.8 °C)-98.7 °F (37.1 °C)] 98.2 °F (36.8 °C)  Pulse:  [53-97] 63  Resp:  [14-20] 18  SpO2:  [96 %-100 %] 98 %  BP: ()/(52-70) 102/64     Body mass index is 24.33 kg/m².            Drains          None          Physical Exam   Constitutional: He appears well-developed and well-nourished. No distress.   HENT:   Head: Normocephalic and atraumatic.   Eyes: EOM are normal. No scleral icterus.   Cardiovascular: Normal rate and regular rhythm.    Pulmonary/Chest: Effort normal. No respiratory distress.   Abdominal: Soft. He exhibits no distension. There is no tenderness.   SPT site with 16fr catheter with clear yellow urine   Genitourinary:   Genitourinary Comments: Neophallus with small areas that appear to have dehisced at ventral suture line. Neomeatus patent. No areas of fluctuance of neophallus. Marc-penoscrotal junction with obvious wound dehiscence, area is damp and extremely tender. Neoscrotum tender, no areas of fluctuance. Vaginectomy site mostly healed, one small area of erythematous and inflamed tissue at most dependent portion near posterior aspect of neoscrotum at the midline.    Musculoskeletal: He exhibits no edema.   Neurological: He is alert.   Skin: Skin is warm and dry.     Psychiatric: He has a normal mood and affect. His behavior is normal.       Significant Labs:    BMP:  Recent Labs   Lab  09/08/18   1301   NA  142   K  3.9    CL  107   CO2  26   BUN  10   CREATININE  1.0   CALCIUM  9.6       CBC:  Recent Labs   Lab  09/08/18   1301   WBC  4.58   HGB  13.5*   HCT  42.5   PLT  201       All pertinent labs results from the past 24 hours have been reviewed.    Significant Imaging:  CT: I have reviewed all results within the past 24 hours and my personal findings are:  CT from OSH not personally reviewed and report not available. Per plastic surgery note: 1.3 x 4 cm fluid collection at distal portion of neophallus, likely hematoma                    Assessment and Plan:     Transgender, S/P sex reassignment surgery    - This is an unusual and unfamiliar post op issue. 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 He keeps his SPT clamped which directs urine through his neourethra and fistulous tract. Have discussed with the patient that he will need to divert urine away from his fistula in order for the area to mature. Once mature, the area can can be properly evaluated for definitive repair. Since he already has a suprapubic tube I have recommended that the patient unclamp the tube and leave it draining to gravity. He has urinary sphincter function and is not incontinent. This should prevent urinary leakage through the fistula.   - Will have him see Dr. Holland and possibly plastic surgery in 1-2 weeks here in [place]. At that point his issue can be further evaluated and a plan regarding reconstruction options can be had. However, he also has a physician in [place] that he would also like to meet with who specializes in fistulas and reconstruction.             VTE Risk Mitigation (From admission, onward)    None          Thank you for your consult. I will follow-up with patient. Please contact us if you have any additional questions.    Denton Walton MD  Urology  [facility]

## (undated) DEVICE — Device

## (undated) DEVICE — SET Y-TYPE TUR IRRIGATION

## (undated) DEVICE — GOWN SMARTGOWN LVL4 X-LONG XL

## (undated) DEVICE — PACK CYSTO

## (undated) DEVICE — GUIDEWIRE PTFE .038INX145CM

## (undated) DEVICE — TRAY CYSTO BASIN

## (undated) DEVICE — SET CYSTO IRRIGATION UNIV SPIK

## (undated) DEVICE — WIRE GUIDE 0.038OLD

## (undated) DEVICE — SOL NS 1000CC

## (undated) DEVICE — SOL IRR WATER STRL 3000 ML

## (undated) DEVICE — SOL IRR NACL .9% 3000ML

## (undated) DEVICE — EXTRACTOR TIPLESS 2.4FRX1115CM

## (undated) DEVICE — GLOVE BIOGEL 7.5

## (undated) DEVICE — CATH 5FR OPEN END URETERAL

## (undated) DEVICE — SYR 10CC LUER LOCK

## (undated) DEVICE — GUIDE WIRE MOTION .035 X 150CM

## (undated) DEVICE — BAG URINARY DRAINAGE 2000ML